# Patient Record
Sex: FEMALE | Race: WHITE | NOT HISPANIC OR LATINO | Employment: UNEMPLOYED
[De-identification: names, ages, dates, MRNs, and addresses within clinical notes are randomized per-mention and may not be internally consistent; named-entity substitution may affect disease eponyms.]

---

## 2017-02-11 ENCOUNTER — HOSPITAL (OUTPATIENT)
Dept: OTHER | Age: 37
End: 2017-02-11
Attending: RADIOLOGY

## 2017-02-27 ENCOUNTER — TELEPHONE (OUTPATIENT)
Dept: SURGERY | Facility: CLINIC | Age: 37
End: 2017-02-27

## 2017-03-14 ENCOUNTER — OFFICE VISIT (OUTPATIENT)
Dept: SURGERY | Facility: CLINIC | Age: 37
End: 2017-03-14

## 2017-03-14 VITALS
DIASTOLIC BLOOD PRESSURE: 78 MMHG | HEIGHT: 66 IN | BODY MASS INDEX: 21.21 KG/M2 | SYSTOLIC BLOOD PRESSURE: 118 MMHG | HEART RATE: 80 BPM | RESPIRATION RATE: 16 BRPM | WEIGHT: 132 LBS

## 2017-03-14 DIAGNOSIS — M54.9 INTRACTABLE BACK PAIN: ICD-10-CM

## 2017-03-14 DIAGNOSIS — C41.9 CHONDROSARCOMA OF BONE (HCC): ICD-10-CM

## 2017-03-14 DIAGNOSIS — M40.14 OTHER SECONDARY KYPHOSIS, THORACIC REGION: Primary | ICD-10-CM

## 2017-03-14 PROCEDURE — 99213 OFFICE O/P EST LOW 20 MIN: CPT | Performed by: NEUROLOGICAL SURGERY

## 2017-03-14 RX ORDER — CYCLOBENZAPRINE HCL 10 MG
10 TABLET ORAL 3 TIMES DAILY PRN
Qty: 60 TABLET | Refills: 1 | Status: SHIPPED | OUTPATIENT
Start: 2017-03-14 | End: 2017-08-24

## 2017-03-14 NOTE — PATIENT INSTRUCTIONS
Refill policies:    • Allow 2 business days for refills; controlled substances may take longer.   • Contact your pharmacy at least 5 days prior to running out of medication and have them send an electronic request or submit request through the “request re your physician has recommended that you have a procedure or additional testing performed. [facility] will contact your insurance carrier to obtain pre-certification or prior authorization.     Unfortunately, ANTOLIN has seen an increas

## 2017-03-14 NOTE — PROGRESS NOTES
Here for follow up with new MRI. Having lower extremity \"heaviness\" which has been getting worse. Having frequent leg cramps. Increased mid and lower back pain since last visit.

## 2017-03-14 NOTE — PROGRESS NOTES
Neurosurgery Clinic Visit  3/14/2017    Yazmin Wallace [address] PCP:  Julian Luna MD    1980 MRN [id number]     HISTORY OF PRESENT ILLNESS:  Kaylie Barnard is a(n) 39year old female here for follow-up  Patient got her MRI of her CT and L-s

## 2017-05-16 ENCOUNTER — OFFICE VISIT (OUTPATIENT)
Dept: NEUROLOGY | Facility: CLINIC | Age: 37
End: 2017-05-16

## 2017-05-16 VITALS
BODY MASS INDEX: 22 KG/M2 | WEIGHT: 137 LBS | RESPIRATION RATE: 18 BRPM | DIASTOLIC BLOOD PRESSURE: 80 MMHG | HEART RATE: 88 BPM | SYSTOLIC BLOOD PRESSURE: 120 MMHG

## 2017-05-16 DIAGNOSIS — R25.2 LEG CRAMPS: ICD-10-CM

## 2017-05-16 DIAGNOSIS — R25.3 TWITCHING: ICD-10-CM

## 2017-05-16 DIAGNOSIS — H93.11 TINNITUS, RIGHT: ICD-10-CM

## 2017-05-16 DIAGNOSIS — C41.0 CHORDOMA OF CLIVUS (HCC): ICD-10-CM

## 2017-05-16 DIAGNOSIS — G40.909 SEIZURE DISORDER (HCC): Primary | ICD-10-CM

## 2017-05-16 PROCEDURE — 99214 OFFICE O/P EST MOD 30 MIN: CPT | Performed by: OTHER

## 2017-05-16 NOTE — PATIENT INSTRUCTIONS
Refill policies:    • Allow 2 business days for refills; controlled substances may take longer.   • Contact your pharmacy at least 5 days prior to running out of medication and have them send an electronic request or submit request through the “request re insurance carrier to obtain pre-certification or prior authorization. Unfortunately, ANTOLIN has seen an increase in denial of payment even though the procedure/test has been pre-certified.   You are strongly encouraged to contact your insurance carrier to v

## 2017-05-16 NOTE — PROGRESS NOTES
HPI:    Patient ID: Amalia Barnes is a 39year old female. HPI   Anthony Alba is 39year old female who presented to establish care with me.  She has history of clivus chordoma s/p resection and radiotherapy in 2002 diagnosed in [place] complicated by ri OTHER SURGICAL HISTORY  4/24/2015    Comment PARTIAL LUMBAR 2, LUMBAR 3 LAMINECTOMY, PARTIAL LUMBAR 5, PARTIAL SACRAL 1 LAMINECTOMY , RESECTION OF INTRADURAL TUMOR       Family History   Problem Relation Age of Onset   • Cancer Mother 61     Cervical CA distress  Head: Normocephalic and atraumatic. Neck: supple  Cardiovascular: Normal rate, regular rhythm and normal heart sounds. Pulmonary/Chest: Effort normal and breath sounds normal.   Abdominal: Soft.  Bowel sounds are normal.   Skin: Skin is warm The patient indicates understanding of these issues and agrees with the plan.         Brenda Villa MD  [facility] This Visit:  No prescriptions requested or ordered in this encounter    Imaging & Referrals:  None     ID#[id number]

## 2017-06-06 ENCOUNTER — NURSE ONLY (OUTPATIENT)
Dept: NEUROLOGY | Facility: CLINIC | Age: 37
End: 2017-06-06

## 2017-06-06 DIAGNOSIS — G40.909 SEIZURE DISORDER (HCC): Primary | ICD-10-CM

## 2017-06-06 PROCEDURE — 95816 EEG AWAKE AND DROWSY: CPT | Performed by: OTHER

## 2017-06-07 NOTE — PROCEDURES
ELECTROENCEPHALOGRAM REPORT      Patient Name: [facility]  Chart ID: [id number]  Ordering Physician: Dr Diya Cotto           Date of Test: 6/6/2017  Patient Diagnosis: Seizure disorder    History:  43 Y/O FEMALE WITH H/O CLIVUS CHORDOMA s/p RESECTI recording there are medium to high amplitude sharps and spikes emerging from the right temporal region with phase reversal at T4. At times they spread to the right central and parietal regions and occasionally to the left hemisphere.  These abnormalities ar

## 2017-06-12 ENCOUNTER — OFFICE VISIT (OUTPATIENT)
Dept: NEUROLOGY | Facility: CLINIC | Age: 37
End: 2017-06-12

## 2017-06-12 VITALS
RESPIRATION RATE: 12 BRPM | HEART RATE: 84 BPM | BODY MASS INDEX: 22 KG/M2 | SYSTOLIC BLOOD PRESSURE: 124 MMHG | DIASTOLIC BLOOD PRESSURE: 80 MMHG | WEIGHT: 136 LBS

## 2017-06-12 DIAGNOSIS — G47.00 INSOMNIA, UNSPECIFIED TYPE: ICD-10-CM

## 2017-06-12 DIAGNOSIS — G40.909 SEIZURE DISORDER (HCC): Primary | ICD-10-CM

## 2017-06-12 DIAGNOSIS — C41.0 CHORDOMA OF CLIVUS (HCC): ICD-10-CM

## 2017-06-12 PROCEDURE — 99213 OFFICE O/P EST LOW 20 MIN: CPT | Performed by: OTHER

## 2017-06-12 RX ORDER — ZOLPIDEM TARTRATE 5 MG/1
5 TABLET ORAL NIGHTLY PRN
Qty: 30 TABLET | Refills: 1 | Status: SHIPPED | OUTPATIENT
Start: 2017-06-12 | End: 2018-08-14 | Stop reason: ALTCHOICE

## 2017-06-12 NOTE — PROGRESS NOTES
HPI:    Patient ID: Deacon Donahue is a 44year old female. Seizures  Pertinent negatives include no headaches. Patient presented for follow up for seizures, eye twitching and buzzing sensation in right ear.  She states since last seen the e vertigo and gait disturbance.     HISTORY:  Past Medical History   Diagnosis Date   • Brain tumor - 2002 s/p removal 6/14/2012   • Osteopenia due to depakote 6/14/2012   • Seizure disorder [facility])      last seizure was 2003 after brain surgery   • Sarcoma (HC daily.  ) Disp: 60 tablet Rfl: 1   LevETIRAcetam  MG Oral Tablet 24 Hr TAKE 1 TABLET BY MOUTH NIGHTLY. Disp: 90 tablet Rfl: 12   Calcium Carbonate-Vit D-Min (CALCIUM 1200 OR) Take 1 tablet by mouth daily.  Disp:  Rfl:    Multiple Vitamins-Minerals (MU dose Ambien to take only as needed and advised caution   Follow up in 3 months    See orders and medications filed with this encounter. The patient indicates understanding of these issues and agrees with the plan.           MD Dorothy Hess

## 2017-06-12 NOTE — PATIENT INSTRUCTIONS
Refill policies:    • Allow 2-3 business days for refills; controlled substances may take longer.   • Contact your pharmacy at least 5 days prior to running out of medication and have them send an electronic request or submit request through the [facility] have a procedure or additional testing performed. [facility]) will contact your insurance carrier to obtain pre-certification or prior authorization.     Unfortunately, ANTOLIN has seen an increase in denial of payment even though the p

## 2017-06-15 ENCOUNTER — TELEPHONE (OUTPATIENT)
Dept: SURGERY | Facility: CLINIC | Age: 37
End: 2017-06-15

## 2017-06-20 ENCOUNTER — TELEPHONE (OUTPATIENT)
Dept: SURGERY | Facility: CLINIC | Age: 37
End: 2017-06-20

## 2017-07-13 ENCOUNTER — HOSPITAL (OUTPATIENT)
Dept: OTHER | Age: 37
End: 2017-07-13
Attending: RADIOLOGY

## 2017-07-22 ENCOUNTER — HOSPITAL (OUTPATIENT)
Dept: OTHER | Age: 37
End: 2017-07-22
Attending: RADIOLOGY

## 2017-08-10 ENCOUNTER — LAB ENCOUNTER (OUTPATIENT)
Dept: LAB | Age: 37
End: 2017-08-10
Attending: DERMATOLOGY
Payer: COMMERCIAL

## 2017-08-10 DIAGNOSIS — D89.89 SECONDARY IMMUNE DEFICIENCY DISORDER (HCC): Primary | ICD-10-CM

## 2017-08-10 LAB
C-REACTIVE PROTEIN: <0.29 MG/DL (ref ?–1)
FREE T4: 0.8 NG/DL (ref 0.9–1.8)
SED RATE-ML: 10 MM/HR (ref 0–25)
TSI SER-ACNC: 1.26 MIU/ML (ref 0.35–5.5)

## 2017-08-10 PROCEDURE — 84439 ASSAY OF FREE THYROXINE: CPT

## 2017-08-10 PROCEDURE — 86140 C-REACTIVE PROTEIN: CPT

## 2017-08-10 PROCEDURE — 86235 NUCLEAR ANTIGEN ANTIBODY: CPT

## 2017-08-10 PROCEDURE — 84443 ASSAY THYROID STIM HORMONE: CPT

## 2017-08-10 PROCEDURE — 85652 RBC SED RATE AUTOMATED: CPT

## 2017-08-10 PROCEDURE — 86038 ANTINUCLEAR ANTIBODIES: CPT

## 2017-08-11 LAB
ANA SCREEN: NEGATIVE
SSA AUTOAB: <100 AU/ML (ref ?–100)
SSB AUTOAB: <100 AU/ML (ref ?–100)

## 2017-08-17 ENCOUNTER — TELEPHONE (OUTPATIENT)
Dept: SURGERY | Facility: CLINIC | Age: 37
End: 2017-08-17

## 2017-08-24 ENCOUNTER — TELEPHONE (OUTPATIENT)
Dept: SURGERY | Facility: CLINIC | Age: 37
End: 2017-08-24

## 2017-08-24 ENCOUNTER — OFFICE VISIT (OUTPATIENT)
Dept: SURGERY | Facility: CLINIC | Age: 37
End: 2017-08-24

## 2017-08-24 VITALS
WEIGHT: 136 LBS | SYSTOLIC BLOOD PRESSURE: 126 MMHG | BODY MASS INDEX: 21.35 KG/M2 | DIASTOLIC BLOOD PRESSURE: 84 MMHG | HEART RATE: 64 BPM | HEIGHT: 67 IN

## 2017-08-24 DIAGNOSIS — G40.909 SEIZURE DISORDER (HCC): ICD-10-CM

## 2017-08-24 DIAGNOSIS — D49.6 BRAIN TUMOR (HCC): ICD-10-CM

## 2017-08-24 DIAGNOSIS — C41.9 CHONDROSARCOMA OF BONE (HCC): ICD-10-CM

## 2017-08-24 DIAGNOSIS — M40.294 OTHER KYPHOSIS OF THORACIC REGION: Primary | ICD-10-CM

## 2017-08-24 DIAGNOSIS — H49.01 PARTIAL RIGHT THIRD NERVE PALSY: ICD-10-CM

## 2017-08-24 DIAGNOSIS — M51.34 DDD (DEGENERATIVE DISC DISEASE), THORACIC: ICD-10-CM

## 2017-08-24 DIAGNOSIS — C41.0 CHORDOMA OF CLIVUS (HCC): ICD-10-CM

## 2017-08-24 PROCEDURE — 99213 OFFICE O/P EST LOW 20 MIN: CPT | Performed by: NURSE PRACTITIONER

## 2017-08-24 RX ORDER — DIAZEPAM 5 MG/1
5 TABLET ORAL
COMMUNITY
Start: 2017-07-05 | End: 2018-03-23 | Stop reason: ALTCHOICE

## 2017-08-24 RX ORDER — CLOBETASOL PROPIONATE 0.5 MG/G
OINTMENT TOPICAL
COMMUNITY
Start: 2017-08-10 | End: 2017-11-20

## 2017-08-24 RX ORDER — TACROLIMUS 1 MG/G
OINTMENT TOPICAL
COMMUNITY
Start: 2017-08-14 | End: 2017-11-20

## 2017-08-24 RX ORDER — TIZANIDINE 2 MG/1
2 TABLET ORAL EVERY 8 HOURS PRN
Qty: 90 TABLET | Refills: 0 | Status: SHIPPED | OUTPATIENT
Start: 2017-08-24 | End: 2017-11-20

## 2017-08-24 NOTE — PATIENT INSTRUCTIONS
Refill policies:    • Allow 2-3 business days for refills; controlled substances may take longer.   • Contact your pharmacy at least 5 days prior to running out of medication and have them send an electronic request or submit request through the [facility] have a procedure or additional testing performed. [facility]) will contact your insurance carrier to obtain pre-certification or prior authorization.     Unfortunately, ANTOLIN has seen an increase in denial of payment even though the p

## 2017-08-24 NOTE — PROGRESS NOTES
Neurosurgery Clinic Visit  2017    Yazmin Wallace [address] [facility] [facility] PCP:  Julian Luna MD    1980 MRN [id number]       Chief Complaint:  Patient presents with:   Follow - Up: MRI      HISTORY OF PRESENT ILLNESS:  Kaylie Barnard is a(n) 44 year ol Carbonate-Vit D-Min (CALCIUM 1200 OR) Take 1 tablet by mouth daily. Disp:  Rfl:    Multiple Vitamins-Minerals (MULTIVITAMIN OR) Take 1 tablet by mouth daily. Disp:  Rfl:    Magnesium 500 MG Oral Tab Take 1 tablet by mouth daily.  Disp:  Rfl:    Glucosamine with Dr. Brittnee Pham possible RFA of the thoracic facet joint as she has had 60% relief for a few weeks  in the past with this injection. Advised patient that since this was done several months ago she may need to follow-up with Dr. Brittnee Pham.       Imaging and anatom

## 2017-08-25 ENCOUNTER — TELEPHONE (OUTPATIENT)
Dept: SURGERY | Facility: CLINIC | Age: 37
End: 2017-08-25

## 2017-08-25 NOTE — TELEPHONE ENCOUNTER
Spoke with Dr. Jerris Cranker regarding possible RFA to the thoracic facet joints as she had had relief with her previous facet joint injections. He prefers to see her in the office.   Called patient and spoke with her and she reports she has an appointment with [facility]

## 2017-09-08 ENCOUNTER — OFFICE VISIT (OUTPATIENT)
Dept: SURGERY | Facility: CLINIC | Age: 37
End: 2017-09-08

## 2017-09-08 VITALS
WEIGHT: 136 LBS | DIASTOLIC BLOOD PRESSURE: 68 MMHG | BODY MASS INDEX: 21.35 KG/M2 | HEIGHT: 67 IN | RESPIRATION RATE: 16 BRPM | HEART RATE: 74 BPM | SYSTOLIC BLOOD PRESSURE: 122 MMHG

## 2017-09-08 DIAGNOSIS — M47.814 FACET ARTHROPATHY, THORACIC: Primary | ICD-10-CM

## 2017-09-08 DIAGNOSIS — M79.18 MYOFASCIAL MUSCLE PAIN: ICD-10-CM

## 2017-09-08 DIAGNOSIS — R56.9 SEIZURES (HCC): ICD-10-CM

## 2017-09-08 PROCEDURE — 99213 OFFICE O/P EST LOW 20 MIN: CPT | Performed by: PHYSICIAN ASSISTANT

## 2017-09-08 NOTE — PATIENT INSTRUCTIONS
Refill policies:    • Allow 2-3 business days for refills; controlled substances may take longer.   • Contact your pharmacy at least 5 days prior to running out of medication and have them send an electronic request or submit request through the [facility] have a procedure or additional testing performed. [facility]) will contact your insurance carrier to obtain pre-certification or prior authorization.     Unfortunately, ANTOLIN has seen an increase in denial of payment even though the p

## 2017-09-08 NOTE — PROGRESS NOTES
Name: Trena Guzman   : 1980   DOS: 2017     Pain Clinic Follow Up Visit:   Patient presents with:   Follow - Up: back pain      Trena Guzman is a 44year old female with seizure disorder after clivus chordorma resection in [place] no acute distress. Neurologic[de-identified] WNL-Orientation to time, place and person, normal mood & affect, concentration & attention span intact. Inspection:  Ambulates with well-coordinated, fluid, non-antalgic gait.   Gait is normal.  Back: no tenderness to bila

## 2017-09-19 ENCOUNTER — TELEPHONE (OUTPATIENT)
Dept: SURGERY | Facility: CLINIC | Age: 37
End: 2017-09-19

## 2017-11-08 ENCOUNTER — TELEPHONE (OUTPATIENT)
Dept: SURGERY | Facility: CLINIC | Age: 37
End: 2017-11-08

## 2017-11-12 DIAGNOSIS — R56.9 SEIZURES (HCC): ICD-10-CM

## 2017-11-13 RX ORDER — LEVETIRACETAM 750 MG/1
TABLET, FILM COATED, EXTENDED RELEASE ORAL
Qty: 30 TABLET | Refills: 1 | Status: SHIPPED | OUTPATIENT
Start: 2017-11-13 | End: 2018-01-16

## 2017-11-14 ENCOUNTER — HOSPITAL (OUTPATIENT)
Dept: OTHER | Age: 37
End: 2017-11-14
Attending: OBSTETRICS & GYNECOLOGY

## 2017-11-15 ENCOUNTER — CHARTING TRANS (OUTPATIENT)
Dept: OTHER | Age: 37
End: 2017-11-15

## 2017-11-20 PROBLEM — R79.89 LFT ELEVATION: Status: ACTIVE | Noted: 2017-11-20

## 2017-12-04 PROCEDURE — 87340 HEPATITIS B SURFACE AG IA: CPT | Performed by: INTERNAL MEDICINE

## 2017-12-04 PROCEDURE — 83883 ASSAY NEPHELOMETRY NOT SPEC: CPT | Performed by: INTERNAL MEDICINE

## 2017-12-04 PROCEDURE — 82784 ASSAY IGA/IGD/IGG/IGM EACH: CPT | Performed by: INTERNAL MEDICINE

## 2017-12-04 PROCEDURE — 82746 ASSAY OF FOLIC ACID SERUM: CPT | Performed by: INTERNAL MEDICINE

## 2017-12-04 PROCEDURE — 83516 IMMUNOASSAY NONANTIBODY: CPT | Performed by: INTERNAL MEDICINE

## 2017-12-04 PROCEDURE — 82607 VITAMIN B-12: CPT | Performed by: INTERNAL MEDICINE

## 2017-12-04 PROCEDURE — 86706 HEP B SURFACE ANTIBODY: CPT | Performed by: INTERNAL MEDICINE

## 2017-12-04 PROCEDURE — 86803 HEPATITIS C AB TEST: CPT | Performed by: INTERNAL MEDICINE

## 2017-12-14 ENCOUNTER — TELEPHONE (OUTPATIENT)
Dept: SURGERY | Facility: CLINIC | Age: 37
End: 2017-12-14

## 2017-12-18 PROCEDURE — 82390 ASSAY OF CERULOPLASMIN: CPT | Performed by: INTERNAL MEDICINE

## 2017-12-18 PROCEDURE — 82104 ALPHA-1-ANTITRYPSIN PHENO: CPT | Performed by: INTERNAL MEDICINE

## 2017-12-18 PROCEDURE — 82103 ALPHA-1-ANTITRYPSIN TOTAL: CPT | Performed by: INTERNAL MEDICINE

## 2018-01-16 DIAGNOSIS — R56.9 SEIZURES (HCC): ICD-10-CM

## 2018-01-16 RX ORDER — LEVETIRACETAM 750 MG/1
TABLET, FILM COATED, EXTENDED RELEASE ORAL
Qty: 30 TABLET | Refills: 0 | Status: SHIPPED | OUTPATIENT
Start: 2018-01-16 | End: 2018-02-15

## 2018-02-15 DIAGNOSIS — R56.9 SEIZURES (HCC): ICD-10-CM

## 2018-02-16 NOTE — TELEPHONE ENCOUNTER
From: Oliva Whitlock  Sent: 2/15/2018 5:57 PM CST  Subject: Medication Renewal Request    Janee Gregory Player would like a refill of the following medications:     LEVETIRACETAM  MG Oral Tablet 24 Hr Jose Jaimes MD]    Preferred pharmacy: [facility] [address], [address] [phone], [phone]

## 2018-02-19 RX ORDER — LEVETIRACETAM 750 MG/1
1 TABLET, FILM COATED, EXTENDED RELEASE ORAL NIGHTLY
Qty: 30 TABLET | Refills: 0 | Status: SHIPPED
Start: 2018-02-19 | End: 2018-03-23

## 2018-02-20 RX ORDER — LEVETIRACETAM 750 MG/1
TABLET, EXTENDED RELEASE ORAL
Qty: 30 TABLET | Refills: 0 | OUTPATIENT
Start: 2018-02-20

## 2018-02-20 NOTE — TELEPHONE ENCOUNTER
3/23/18 appointment made. Apryl Renee was approved and sent to pharmacy 2/19/18. Verified pharmacy received it.

## 2018-03-23 ENCOUNTER — OFFICE VISIT (OUTPATIENT)
Dept: NEUROLOGY | Facility: CLINIC | Age: 38
End: 2018-03-23

## 2018-03-23 VITALS
BODY MASS INDEX: 22 KG/M2 | SYSTOLIC BLOOD PRESSURE: 122 MMHG | HEART RATE: 84 BPM | WEIGHT: 135 LBS | DIASTOLIC BLOOD PRESSURE: 84 MMHG | RESPIRATION RATE: 16 BRPM

## 2018-03-23 DIAGNOSIS — R56.9 SEIZURES (HCC): ICD-10-CM

## 2018-03-23 DIAGNOSIS — G40.909 SEIZURE DISORDER (HCC): Primary | ICD-10-CM

## 2018-03-23 PROCEDURE — 99213 OFFICE O/P EST LOW 20 MIN: CPT | Performed by: OTHER

## 2018-03-23 RX ORDER — LEVETIRACETAM 750 MG/1
1 TABLET, EXTENDED RELEASE ORAL NIGHTLY
Qty: 30 TABLET | Refills: 5 | Status: SHIPPED | OUTPATIENT
Start: 2018-03-23 | End: 2018-09-23

## 2018-03-23 NOTE — PATIENT INSTRUCTIONS
Refill policies:    • Allow 2-3 business days for refills; controlled substances may take longer.   • Contact your pharmacy at least 5 days prior to running out of medication and have them send an electronic request or submit request through the [facility] for the entire amount billed. Precertification and Prior Authorizations  If your physician has recommended that you have a procedure or additional testing performed.   [facility] [employer] ([employer]) will contact your insurance carrier to obtain pr

## 2018-03-26 PROCEDURE — 88313 SPECIAL STAINS GROUP 2: CPT | Performed by: INTERNAL MEDICINE

## 2018-03-26 PROCEDURE — 88307 TISSUE EXAM BY PATHOLOGIST: CPT | Performed by: INTERNAL MEDICINE

## 2018-07-30 ENCOUNTER — TELEPHONE (OUTPATIENT)
Dept: SURGERY | Facility: CLINIC | Age: 38
End: 2018-07-30

## 2018-08-14 ENCOUNTER — OFFICE VISIT (OUTPATIENT)
Dept: SURGERY | Facility: CLINIC | Age: 38
End: 2018-08-14
Payer: COMMERCIAL

## 2018-08-14 VITALS — HEART RATE: 80 BPM | DIASTOLIC BLOOD PRESSURE: 70 MMHG | SYSTOLIC BLOOD PRESSURE: 110 MMHG | HEIGHT: 65 IN

## 2018-08-14 DIAGNOSIS — M40.14 OTHER SECONDARY KYPHOSIS, THORACIC REGION: ICD-10-CM

## 2018-08-14 DIAGNOSIS — M54.9 INTRACTABLE BACK PAIN: ICD-10-CM

## 2018-08-14 DIAGNOSIS — M54.40 BILATERAL LOW BACK PAIN WITH SCIATICA, SCIATICA LATERALITY UNSPECIFIED, UNSPECIFIED CHRONICITY: Primary | ICD-10-CM

## 2018-08-14 PROCEDURE — 99213 OFFICE O/P EST LOW 20 MIN: CPT | Performed by: NEUROLOGICAL SURGERY

## 2018-08-14 NOTE — PROGRESS NOTES
Neurosurgery Clinic Visit  2018    Penelope Junaid [address] [facility] PCP:  Mimi Jasso MD    1980 MRN [id number]     HISTORY OF PRESENT ILLNESS:  Kari Canales is a(n) 43year old female here for yearly follow-up  She is doing great  No complai

## 2018-08-14 NOTE — PROGRESS NOTES
Pt is here for follow up to review imaging   Last office visit 1 yr ago     Denies any changes she would like to discuss

## 2018-09-23 DIAGNOSIS — R56.9 SEIZURES (HCC): ICD-10-CM

## 2018-09-24 RX ORDER — LEVETIRACETAM 750 MG/1
TABLET, FILM COATED, EXTENDED RELEASE ORAL
Qty: 30 TABLET | Refills: 0 | Status: SHIPPED | OUTPATIENT
Start: 2018-09-24 | End: 2018-10-23

## 2018-09-25 ENCOUNTER — TELEPHONE (OUTPATIENT)
Dept: SURGERY | Facility: CLINIC | Age: 38
End: 2018-09-25

## 2018-10-12 PROBLEM — S90.122A CONTUSION OF LESSER TOE OF LEFT FOOT WITHOUT DAMAGE TO NAIL, INITIAL ENCOUNTER: Status: ACTIVE | Noted: 2018-10-12

## 2018-10-23 ENCOUNTER — OFFICE VISIT (OUTPATIENT)
Dept: NEUROLOGY | Facility: CLINIC | Age: 38
End: 2018-10-23
Payer: COMMERCIAL

## 2018-10-23 VITALS
HEART RATE: 78 BPM | BODY MASS INDEX: 21 KG/M2 | RESPIRATION RATE: 16 BRPM | SYSTOLIC BLOOD PRESSURE: 120 MMHG | WEIGHT: 132 LBS | DIASTOLIC BLOOD PRESSURE: 76 MMHG

## 2018-10-23 DIAGNOSIS — G40.909 SEIZURE DISORDER (HCC): Primary | ICD-10-CM

## 2018-10-23 DIAGNOSIS — R56.9 SEIZURES (HCC): ICD-10-CM

## 2018-10-23 PROCEDURE — 99213 OFFICE O/P EST LOW 20 MIN: CPT | Performed by: OTHER

## 2018-10-23 RX ORDER — LEVETIRACETAM 750 MG/1
1 TABLET, EXTENDED RELEASE ORAL NIGHTLY
Qty: 30 TABLET | Refills: 5 | Status: SHIPPED | OUTPATIENT
Start: 2018-10-23 | End: 2019-06-19

## 2018-10-23 NOTE — PROGRESS NOTES
The patient is here for a follow-up for seizures. The patient has had no seizures since her last office visit.

## 2018-10-23 NOTE — PROGRESS NOTES
HPI:    Patient ID: Pam Benites is a 43year old female. HPI      Patient presented for follow up for seizures. She is doing fine and denies any breakthrough seizure activity and aura symptoms.  States the liver biopsy shows a fatty liver but sh Onset   • Cancer Mother 61        Cervical CA   • Heart Disorder Mother    • Heart Disorder Father       Social History    Tobacco Use      Smoking status: Former Smoker        Packs/day: 0.10        Types: Cigarettes      Smokeless tobacco: Never Used developed, well nourished  Head: Normocephalic and atraumatic. Neck: Normal range of motion. Neck supple. Cardiovascular: Normal rate, regular rhythm and normal heart sounds. Pulmonary/Chest: Effort normal and breath sounds normal.   Abdominal: Soft.

## 2018-11-21 ENCOUNTER — TELEPHONE (OUTPATIENT)
Dept: SURGERY | Facility: CLINIC | Age: 38
End: 2018-11-21

## 2019-06-19 DIAGNOSIS — R56.9 SEIZURES (HCC): ICD-10-CM

## 2019-06-19 RX ORDER — LEVETIRACETAM 750 MG/1
TABLET, FILM COATED, EXTENDED RELEASE ORAL
Qty: 30 TABLET | Refills: 2 | Status: SHIPPED | OUTPATIENT
Start: 2019-06-19 | End: 2019-08-26

## 2019-08-19 ENCOUNTER — TELEPHONE (OUTPATIENT)
Dept: SURGERY | Facility: CLINIC | Age: 39
End: 2019-08-19

## 2019-08-19 NOTE — TELEPHONE ENCOUNTER
2 copies of OV notes dated 08/13 received, one for Dr. Bashir Durand, one for Dr. Tawny Gunter; one copy endorsed to each provider

## 2019-08-24 DIAGNOSIS — R56.9 SEIZURES (HCC): ICD-10-CM

## 2019-08-26 RX ORDER — LEVETIRACETAM 750 MG/1
TABLET, EXTENDED RELEASE ORAL
Qty: 30 TABLET | Refills: 0 | Status: SHIPPED | OUTPATIENT
Start: 2019-08-26 | End: 2019-09-24

## 2019-08-26 NOTE — TELEPHONE ENCOUNTER
Patient informed  Needs appointment for further refills. Patient given 9/24/19 OV.    Medication: Keppra    Date of last refill: 6/19/19 (#30/2)  [place]    Last office visit: 10/23/18  Due back to clinic per last offi

## 2019-09-05 ENCOUNTER — OFFICE VISIT (OUTPATIENT)
Dept: SURGERY | Facility: CLINIC | Age: 39
End: 2019-09-05
Payer: COMMERCIAL

## 2019-09-05 ENCOUNTER — TELEPHONE (OUTPATIENT)
Dept: SURGERY | Facility: CLINIC | Age: 39
End: 2019-09-05

## 2019-09-05 VITALS
RESPIRATION RATE: 16 BRPM | SYSTOLIC BLOOD PRESSURE: 110 MMHG | DIASTOLIC BLOOD PRESSURE: 70 MMHG | OXYGEN SATURATION: 98 % | HEART RATE: 75 BPM

## 2019-09-05 DIAGNOSIS — C41.9 CHONDROSARCOMA (HCC): Primary | ICD-10-CM

## 2019-09-05 PROCEDURE — 99213 OFFICE O/P EST LOW 20 MIN: CPT | Performed by: PHYSICIAN ASSISTANT

## 2019-09-05 NOTE — PROGRESS NOTES
Neurosurgery Clinic Visit  2019    Janee BECERRA [address] [facility] PCP:  Saskia Kline MD    1980 MRN [id number]     HISTORY OF PRESENT ILLNESS:  Nikos Bautista is a(n) 35year old female who is here for yearly follow-up for brain and spinal cord case at the [facility] neuro oncology tumor board to decide on continued observation vs. Gamma knife radiosurgery for the cerebellar lesion. Will await recommendations from this conference, and we will also review our our neuro-oncology conference.   Will contact

## 2019-09-05 NOTE — TELEPHONE ENCOUNTER
Spoke with [facility] and requested reports be faxed to our office. Once reports are received will endorse to /NATALI Harley.

## 2019-09-12 ENCOUNTER — TELEPHONE (OUTPATIENT)
Dept: SURGERY | Facility: CLINIC | Age: 39
End: 2019-09-12

## 2019-09-12 DIAGNOSIS — M40.14 OTHER SECONDARY KYPHOSIS, THORACIC REGION: ICD-10-CM

## 2019-09-12 DIAGNOSIS — M54.40 BILATERAL LOW BACK PAIN WITH SCIATICA, SCIATICA LATERALITY UNSPECIFIED, UNSPECIFIED CHRONICITY: Primary | ICD-10-CM

## 2019-09-12 DIAGNOSIS — M54.9 INTRACTABLE BACK PAIN: ICD-10-CM

## 2019-09-12 NOTE — TELEPHONE ENCOUNTER
Called and spoke with patient    Case discussed at tumor board  Lesion has been there since at least 2016  She plans on having gamma knife to lesion in a few weeks    Asked for physical therapy script, rx to be mailed    Will f/u after gamma knife    Pt ap

## 2019-09-24 ENCOUNTER — OFFICE VISIT (OUTPATIENT)
Dept: NEUROLOGY | Facility: CLINIC | Age: 39
End: 2019-09-24
Payer: COMMERCIAL

## 2019-09-24 VITALS
HEART RATE: 68 BPM | RESPIRATION RATE: 16 BRPM | WEIGHT: 126 LBS | SYSTOLIC BLOOD PRESSURE: 116 MMHG | BODY MASS INDEX: 20 KG/M2 | DIASTOLIC BLOOD PRESSURE: 60 MMHG

## 2019-09-24 DIAGNOSIS — G40.909 SEIZURE DISORDER (HCC): Primary | ICD-10-CM

## 2019-09-24 PROCEDURE — 99213 OFFICE O/P EST LOW 20 MIN: CPT | Performed by: OTHER

## 2019-09-24 RX ORDER — LEVETIRACETAM 750 MG/1
TABLET, EXTENDED RELEASE ORAL
Qty: 30 TABLET | Refills: 5 | Status: SHIPPED | OUTPATIENT
Start: 2019-09-24 | End: 2020-03-23

## 2019-09-24 NOTE — PROGRESS NOTES
The patient states no seizures in the past year. Patient states some slight aura, however no seizures. Patient states she is having whole body tremors which happen at least once a month. Patient is also having some stomach issues.  Denies and balance issues

## 2019-09-24 NOTE — PROGRESS NOTES
HPI:    Patient ID: Freddie Saeed is a 35year old female. HPI      Patient presented for follow up for seizure disorder. She had no seizures in the past year. Patient states some slight aura, however no seizures.  Patient states she is having gus THORACIC FACET JOINT INJECTION DIAGNOSTIC Left 9/12/2016    Performed by Luis Martinez MD at [facility] MAIN OR   • THORACIC FACET JOINT INJECTION DIAGNOSTIC Right 8/29/2016    Performed by Luis Martinez MD at [facility] MAIN OR      Family History   Problem Relat [[place]]       UNKNOWN    Comment:Had reaction as a child. PHYSICAL EXAM:   Physical Exam    Blood pressure 116/60, pulse 68, resp. rate 16, weight 126 lb.   General: A 44year old female, well developed, well nourished  Head: Normocephalic and atrau

## 2019-10-02 ENCOUNTER — TELEPHONE (OUTPATIENT)
Dept: SURGERY | Facility: CLINIC | Age: 39
End: 2019-10-02

## 2019-11-20 ENCOUNTER — TELEPHONE (OUTPATIENT)
Dept: SURGERY | Facility: CLINIC | Age: 39
End: 2019-11-20

## 2019-11-20 NOTE — TELEPHONE ENCOUNTER
progress note for DOS 11/14/2019 given to Dr. Harris Galvan for review and signature  then fax back.

## 2019-12-27 PROBLEM — J18.9 PNEUMONIA OF RIGHT LOWER LOBE DUE TO INFECTIOUS ORGANISM: Status: ACTIVE | Noted: 2019-12-27

## 2020-01-09 ENCOUNTER — HOSPITAL (OUTPATIENT)
Dept: OTHER | Age: 40
End: 2020-01-09

## 2020-01-09 ENCOUNTER — DIAGNOSTIC TRANS (OUTPATIENT)
Dept: OTHER | Age: 40
End: 2020-01-09

## 2020-01-09 LAB
ALBUMIN SERPL-MCNC: 3.9 G/DL (ref 3.6–5.1)
ALP SERPL-CCNC: 91 UNITS/L (ref 45–117)
ALT SERPL-CCNC: 167 UNITS/L
AMORPH SED URNS QL MICRO: ABNORMAL
ANALYZER ANC (IANC): ABNORMAL
ANION GAP SERPL CALC-SCNC: 10 MMOL/L (ref 10–20)
APPEARANCE UR: ABNORMAL
APTT PPP: 25 SEC (ref 22–32)
APTT PPP: NORMAL S
APTT PPP: NORMAL S
AST SERPL-CCNC: 385 UNITS/L
BACTERIA #/AREA URNS HPF: ABNORMAL /HPF
BASOPHILS # BLD: 0 K/MCL (ref 0–0.3)
BASOPHILS NFR BLD: 0 %
BILIRUB CONJ SERPL-MCNC: 1.1 MG/DL (ref 0–0.2)
BILIRUB SERPL-MCNC: 3.1 MG/DL (ref 0.2–1)
BILIRUB UR QL: NEGATIVE
BUN SERPL-MCNC: 18 MG/DL (ref 6–20)
BUN/CREAT SERPL: 29 (ref 7–25)
CALCIUM SERPL-MCNC: 8.9 MG/DL (ref 8.4–10.2)
CAOX CRY URNS QL MICRO: ABNORMAL
CHLORIDE SERPL-SCNC: 106 MMOL/L (ref 98–107)
CO2 SERPL-SCNC: 23 MMOL/L (ref 21–32)
COLOR UR: ABNORMAL
CREAT SERPL-MCNC: 0.63 MG/DL (ref 0.51–0.95)
DIFFERENTIAL METHOD BLD: ABNORMAL
EOSINOPHIL # BLD: 0.1 K/MCL (ref 0.1–0.5)
EOSINOPHIL NFR BLD: 1 %
EPITH CASTS #/AREA URNS LPF: ABNORMAL /[LPF]
ERYTHROCYTE [DISTWIDTH] IN BLOOD: 12.5 % (ref 11–15)
ETHANOL SERPL-MCNC: NORMAL MG/DL
FATTY CASTS #/AREA URNS LPF: ABNORMAL /[LPF]
GLUCOSE SERPL-MCNC: 116 MG/DL (ref 65–99)
GLUCOSE UR-MCNC: NEGATIVE MG/DL
GRAN CASTS #/AREA URNS LPF: ABNORMAL /[LPF]
HCG POINT OF CARE (5HGRST): NEGATIVE
HCT VFR BLD CALC: 41.4 % (ref 36–46.5)
HGB BLD-MCNC: 14.4 G/DL (ref 12–15.5)
HGB UR QL: NEGATIVE
HYALINE CASTS #/AREA URNS LPF: ABNORMAL /LPF (ref 0–5)
IMM GRANULOCYTES # BLD AUTO: 0 K/MCL (ref 0–0.2)
IMM GRANULOCYTES NFR BLD: 0 %
INR PPP: 1
INR PPP: NORMAL
KETONES UR-MCNC: 20 MG/DL
LEUKOCYTE ESTERASE UR QL STRIP: NEGATIVE
LIPASE SERPL-CCNC: ABNORMAL U/L
LIPASE SERPL-CCNC: ABNORMAL UNITS/L (ref 73–393)
LYMPHOCYTES # BLD: 2 K/MCL (ref 1–4.8)
LYMPHOCYTES NFR BLD: 30 %
MAGNESIUM SERPL-MCNC: 1.9 MG/DL (ref 1.7–2.4)
MCH RBC QN AUTO: 36.5 PG (ref 26–34)
MCHC RBC AUTO-ENTMCNC: 34.8 G/DL (ref 32–36.5)
MCV RBC AUTO: 104.8 FL (ref 78–100)
MIXED CELL CASTS #/AREA URNS LPF: ABNORMAL /[LPF]
MONOCYTES # BLD: 1 K/MCL (ref 0.3–0.9)
MONOCYTES NFR BLD: 15 %
MUCOUS THREADS URNS QL MICRO: PRESENT
NEUTROPHILS # BLD: 3.6 K/MCL (ref 1.8–7.7)
NEUTROPHILS NFR BLD: 54 %
NEUTS SEG NFR BLD: ABNORMAL %
NITRITE UR QL: NEGATIVE
NRBC (NRBCRE): 0 /100 WBC
PH UR: 6 UNITS (ref 5–7)
PLATELET # BLD: 149 K/MCL (ref 140–450)
POTASSIUM SERPL-SCNC: 3.3 MMOL/L (ref 3.4–5.1)
PROCALCITONIN SERPL IA-MCNC: 0.06 NG/ML
PROCALCITONIN SERPL IA-MCNC: NORMAL NG/ML
PROT SERPL-MCNC: 8 G/DL (ref 6.4–8.2)
PROT UR QL: NEGATIVE MG/DL
PROTHROMBIN TIME (PRT2): NORMAL
PROTHROMBIN TIME: 10.3 SEC (ref 9.7–11.8)
RBC # BLD: 3.95 MIL/MCL (ref 4–5.2)
RBC #/AREA URNS HPF: ABNORMAL /HPF (ref 0–2)
RBC CASTS #/AREA URNS LPF: ABNORMAL /[LPF]
RENAL EPI CELLS #/AREA URNS HPF: ABNORMAL /[HPF]
SODIUM SERPL-SCNC: 136 MMOL/L (ref 135–145)
SP GR UR: 1.02 (ref 1–1.03)
SPECIMEN SOURCE: ABNORMAL
SPERM URNS QL MICRO: ABNORMAL
SQUAMOUS #/AREA URNS HPF: ABNORMAL /HPF (ref 0–5)
T VAGINALIS URNS QL MICRO: ABNORMAL
TRI-PHOS CRY URNS QL MICRO: ABNORMAL
URATE CRY URNS QL MICRO: ABNORMAL
URINE REFLEX: ABNORMAL
URNS CMNT MICRO: ABNORMAL
UROBILINOGEN UR QL: 4 MG/DL (ref 0–1)
WAXY CASTS #/AREA URNS LPF: ABNORMAL /[LPF]
WBC # BLD: 6.6 K/MCL (ref 4.2–11)
WBC #/AREA URNS HPF: ABNORMAL /HPF (ref 0–5)
WBC CASTS #/AREA URNS LPF: ABNORMAL /[LPF]
YEAST HYPHAE URNS QL MICRO: ABNORMAL
YEAST URNS QL MICRO: ABNORMAL

## 2020-01-10 LAB
ALBUMIN SERPL-MCNC: 2.6 G/DL (ref 3.6–5.1)
ALBUMIN SERPL-MCNC: 2.6 G/DL (ref 3.6–5.1)
ALBUMIN/GLOB SERPL: 0.9 {RATIO} (ref 1–2.4)
ALBUMIN/GLOB SERPL: 0.9 {RATIO} (ref 1–2.4)
ALP SERPL-CCNC: 58 UNITS/L (ref 45–117)
ALP SERPL-CCNC: 60 UNITS/L (ref 45–117)
ALT SERPL-CCNC: 75 UNITS/L
ALT SERPL-CCNC: 75 UNITS/L
ANALYZER ANC (IANC): ABNORMAL
ANALYZER ANC (IANC): ABNORMAL
ANION GAP SERPL CALC-SCNC: 10 MMOL/L (ref 10–20)
ANION GAP SERPL CALC-SCNC: 9 MMOL/L (ref 10–20)
AST SERPL-CCNC: 105 UNITS/L
AST SERPL-CCNC: 109 UNITS/L
BASOPHILS # BLD: 0 K/MCL (ref 0–0.3)
BASOPHILS # BLD: 0 K/MCL (ref 0–0.3)
BASOPHILS NFR BLD: 0 %
BASOPHILS NFR BLD: 0 %
BILIRUB SERPL-MCNC: 2.4 MG/DL (ref 0.2–1)
BILIRUB SERPL-MCNC: 2.5 MG/DL (ref 0.2–1)
BUN SERPL-MCNC: 9 MG/DL (ref 6–20)
BUN SERPL-MCNC: 9 MG/DL (ref 6–20)
BUN/CREAT SERPL: 15 (ref 7–25)
BUN/CREAT SERPL: 15 (ref 7–25)
CALCIUM SERPL-MCNC: 7.3 MG/DL (ref 8.4–10.2)
CALCIUM SERPL-MCNC: 7.5 MG/DL (ref 8.4–10.2)
CHLORIDE SERPL-SCNC: 109 MMOL/L (ref 98–107)
CHLORIDE SERPL-SCNC: 109 MMOL/L (ref 98–107)
CO2 SERPL-SCNC: 24 MMOL/L (ref 21–32)
CO2 SERPL-SCNC: 24 MMOL/L (ref 21–32)
CREAT SERPL-MCNC: 0.6 MG/DL (ref 0.51–0.95)
CREAT SERPL-MCNC: 0.62 MG/DL (ref 0.51–0.95)
DIFFERENTIAL METHOD BLD: ABNORMAL
DIFFERENTIAL METHOD BLD: ABNORMAL
EOSINOPHIL # BLD: 0.1 K/MCL (ref 0.1–0.5)
EOSINOPHIL # BLD: 0.1 K/MCL (ref 0.1–0.5)
EOSINOPHIL NFR BLD: 2 %
EOSINOPHIL NFR BLD: 2 %
ERYTHROCYTE [DISTWIDTH] IN BLOOD: 12.6 % (ref 11–15)
ERYTHROCYTE [DISTWIDTH] IN BLOOD: 12.7 % (ref 11–15)
FOLATE SERPL-MCNC: 14.3 NG/ML
GLOBULIN SER-MCNC: 2.9 G/DL (ref 2–4)
GLOBULIN SER-MCNC: 3 G/DL (ref 2–4)
GLUCOSE SERPL-MCNC: 65 MG/DL (ref 65–99)
GLUCOSE SERPL-MCNC: 68 MG/DL (ref 65–99)
HCT VFR BLD CALC: 35.8 % (ref 36–46.5)
HCT VFR BLD CALC: 36.1 % (ref 36–46.5)
HGB BLD-MCNC: 11.8 G/DL (ref 12–15.5)
HGB BLD-MCNC: 11.8 G/DL (ref 12–15.5)
IMM GRANULOCYTES # BLD AUTO: 0 K/MCL (ref 0–0.2)
IMM GRANULOCYTES # BLD AUTO: 0 K/MCL (ref 0–0.2)
IMM GRANULOCYTES NFR BLD: 0 %
IMM GRANULOCYTES NFR BLD: 0 %
LIPASE SERPL-CCNC: ABNORMAL UNITS/L (ref 73–393)
LIPASE SERPL-CCNC: ABNORMAL UNITS/L (ref 73–393)
LYMPHOCYTES # BLD: 0.5 K/MCL (ref 1–4.8)
LYMPHOCYTES # BLD: 0.6 K/MCL (ref 1–4.8)
LYMPHOCYTES NFR BLD: 10 %
LYMPHOCYTES NFR BLD: 11 %
MCH RBC QN AUTO: 37.6 PG (ref 26–34)
MCH RBC QN AUTO: 37.8 PG (ref 26–34)
MCHC RBC AUTO-ENTMCNC: 32.7 G/DL (ref 32–36.5)
MCHC RBC AUTO-ENTMCNC: 33 G/DL (ref 32–36.5)
MCV RBC AUTO: 114.7 FL (ref 78–100)
MCV RBC AUTO: 115 FL (ref 78–100)
MONOCYTES # BLD: 0.5 K/MCL (ref 0.3–0.9)
MONOCYTES # BLD: 0.5 K/MCL (ref 0.3–0.9)
MONOCYTES NFR BLD: 10 %
MONOCYTES NFR BLD: 9 %
NEUTROPHILS # BLD: 4.1 K/MCL (ref 1.8–7.7)
NEUTROPHILS # BLD: 4.1 K/MCL (ref 1.8–7.7)
NEUTROPHILS NFR BLD: 78 %
NEUTROPHILS NFR BLD: 78 %
NEUTS SEG NFR BLD: ABNORMAL %
NEUTS SEG NFR BLD: ABNORMAL %
NRBC (NRBCRE): 0 /100 WBC
NRBC (NRBCRE): 0 /100 WBC
PLATELET # BLD: 106 K/MCL (ref 140–450)
PLATELET # BLD: 106 K/MCL (ref 140–450)
POTASSIUM SERPL-SCNC: 3.9 MMOL/L (ref 3.4–5.1)
POTASSIUM SERPL-SCNC: 4 MMOL/L (ref 3.4–5.1)
PROT SERPL-MCNC: 5.5 G/DL (ref 6.4–8.2)
PROT SERPL-MCNC: 5.6 G/DL (ref 6.4–8.2)
RBC # BLD: 3.12 MIL/MCL (ref 4–5.2)
RBC # BLD: 3.14 MIL/MCL (ref 4–5.2)
SODIUM SERPL-SCNC: 138 MMOL/L (ref 135–145)
SODIUM SERPL-SCNC: 139 MMOL/L (ref 135–145)
VIT B12 SERPL-MCNC: 617 PG/ML (ref 211–911)
WBC # BLD: 5.2 K/MCL (ref 4.2–11)
WBC # BLD: 5.2 K/MCL (ref 4.2–11)

## 2020-01-11 LAB
ALBUMIN SERPL-MCNC: 2.5 G/DL (ref 3.6–5.1)
ALBUMIN/GLOB SERPL: 0.8 {RATIO} (ref 1–2.4)
ALP SERPL-CCNC: 60 UNITS/L (ref 45–117)
ALT SERPL-CCNC: 63 UNITS/L
ANALYZER ANC (IANC): ABNORMAL
ANION GAP SERPL CALC-SCNC: 14 MMOL/L (ref 10–20)
AST SERPL-CCNC: 84 UNITS/L
BASOPHILS # BLD: 0 K/MCL (ref 0–0.3)
BASOPHILS NFR BLD: 0 %
BILIRUB SERPL-MCNC: 1.9 MG/DL (ref 0.2–1)
BUN SERPL-MCNC: 5 MG/DL (ref 6–20)
BUN/CREAT SERPL: 9 (ref 7–25)
CALCIUM SERPL-MCNC: 7 MG/DL (ref 8.4–10.2)
CHLORIDE SERPL-SCNC: 104 MMOL/L (ref 98–107)
CO2 SERPL-SCNC: 22 MMOL/L (ref 21–32)
CREAT SERPL-MCNC: 0.56 MG/DL (ref 0.51–0.95)
DIFFERENTIAL METHOD BLD: ABNORMAL
EOSINOPHIL # BLD: 0.1 K/MCL (ref 0.1–0.5)
EOSINOPHIL NFR BLD: 1 %
ERYTHROCYTE [DISTWIDTH] IN BLOOD: 12.4 % (ref 11–15)
GLOBULIN SER-MCNC: 3.3 G/DL (ref 2–4)
GLUCOSE SERPL-MCNC: 71 MG/DL (ref 65–99)
HCT VFR BLD CALC: 34.9 % (ref 36–46.5)
HGB BLD-MCNC: 11.8 G/DL (ref 12–15.5)
IMM GRANULOCYTES # BLD AUTO: 0.1 K/MCL (ref 0–0.2)
IMM GRANULOCYTES NFR BLD: 1 %
LYMPHOCYTES # BLD: 0.7 K/MCL (ref 1–4.8)
LYMPHOCYTES NFR BLD: 9 %
MCH RBC QN AUTO: 38.3 PG (ref 26–34)
MCHC RBC AUTO-ENTMCNC: 33.8 G/DL (ref 32–36.5)
MCV RBC AUTO: 113.3 FL (ref 78–100)
MONOCYTES # BLD: 0.9 K/MCL (ref 0.3–0.9)
MONOCYTES NFR BLD: 14 %
NEUTROPHILS # BLD: 5.2 K/MCL (ref 1.8–7.7)
NEUTROPHILS NFR BLD: 75 %
NEUTS SEG NFR BLD: ABNORMAL %
NRBC (NRBCRE): 0 /100 WBC
PLATELET # BLD: 120 K/MCL (ref 140–450)
POTASSIUM SERPL-SCNC: 3.6 MMOL/L (ref 3.4–5.1)
PROT SERPL-MCNC: 5.8 G/DL (ref 6.4–8.2)
RBC # BLD: 3.08 MIL/MCL (ref 4–5.2)
SODIUM SERPL-SCNC: 136 MMOL/L (ref 135–145)
TRIGL SERPL-MCNC: 54 MG/DL
TRIGL SERPL-MCNC: NORMAL MG/DL
WBC # BLD: 6.9 K/MCL (ref 4.2–11)

## 2020-01-12 LAB
ALBUMIN SERPL-MCNC: 2.3 G/DL (ref 3.6–5.1)
ALBUMIN/GLOB SERPL: 0.7 {RATIO} (ref 1–2.4)
ALP SERPL-CCNC: 61 UNITS/L (ref 45–117)
ALT SERPL-CCNC: 50 UNITS/L
ANALYZER ANC (IANC): ABNORMAL
ANION GAP SERPL CALC-SCNC: 10 MMOL/L (ref 10–20)
AST SERPL-CCNC: 61 UNITS/L
BASOPHILS # BLD: 0 K/MCL (ref 0–0.3)
BASOPHILS NFR BLD: 0 %
BILIRUB SERPL-MCNC: 1.8 MG/DL (ref 0.2–1)
BUN SERPL-MCNC: 2 MG/DL (ref 6–20)
BUN/CREAT SERPL: 4 (ref 7–25)
CALCIUM SERPL-MCNC: 7.3 MG/DL (ref 8.4–10.2)
CHLORIDE SERPL-SCNC: 105 MMOL/L (ref 98–107)
CO2 SERPL-SCNC: 23 MMOL/L (ref 21–32)
CREAT SERPL-MCNC: 0.5 MG/DL (ref 0.51–0.95)
DIFFERENTIAL METHOD BLD: ABNORMAL
EOSINOPHIL # BLD: 0.1 K/MCL (ref 0.1–0.5)
EOSINOPHIL NFR BLD: 2 %
ERYTHROCYTE [DISTWIDTH] IN BLOOD: 12.1 % (ref 11–15)
GLOBULIN SER-MCNC: 3.5 G/DL (ref 2–4)
GLUCOSE SERPL-MCNC: 81 MG/DL (ref 65–99)
HCT VFR BLD CALC: 36.4 % (ref 36–46.5)
HGB BLD-MCNC: 12.2 G/DL (ref 12–15.5)
IMM GRANULOCYTES # BLD AUTO: 0.1 K/MCL (ref 0–0.2)
IMM GRANULOCYTES NFR BLD: 1 %
LIPASE SERPL-CCNC: 783 UNITS/L (ref 73–393)
LYMPHOCYTES # BLD: 0.7 K/MCL (ref 1–4.8)
LYMPHOCYTES NFR BLD: 10 %
MCH RBC QN AUTO: 36.7 PG (ref 26–34)
MCHC RBC AUTO-ENTMCNC: 33.5 G/DL (ref 32–36.5)
MCV RBC AUTO: 109.6 FL (ref 78–100)
MONOCYTES # BLD: 1.6 K/MCL (ref 0.3–0.9)
MONOCYTES NFR BLD: 23 %
NEUTROPHILS # BLD: 4.3 K/MCL (ref 1.8–7.7)
NEUTROPHILS NFR BLD: 64 %
NEUTS SEG NFR BLD: ABNORMAL %
NRBC (NRBCRE): 0 /100 WBC
PLATELET # BLD: 142 K/MCL (ref 140–450)
POTASSIUM SERPL-SCNC: 3.5 MMOL/L (ref 3.4–5.1)
PROCALCITONIN SERPL IA-MCNC: 0.55 NG/ML
PROCALCITONIN SERPL IA-MCNC: ABNORMAL NG/ML
PROT SERPL-MCNC: 5.8 G/DL (ref 6.4–8.2)
RBC # BLD: 3.32 MIL/MCL (ref 4–5.2)
SODIUM SERPL-SCNC: 135 MMOL/L (ref 135–145)
WBC # BLD: 6.8 K/MCL (ref 4.2–11)

## 2020-01-13 LAB
ALBUMIN SERPL-MCNC: 2.1 G/DL (ref 3.6–5.1)
ALP SERPL-CCNC: 51 UNITS/L (ref 45–117)
ALT SERPL-CCNC: 40 UNITS/L
ANALYZER ANC (IANC): ABNORMAL
ANION GAP SERPL CALC-SCNC: 10 MMOL/L (ref 10–20)
AST SERPL-CCNC: 44 UNITS/L
BASOPHILS # BLD: 0 K/MCL (ref 0–0.3)
BASOPHILS NFR BLD: 0 %
BILIRUB CONJ SERPL-MCNC: 0.6 MG/DL (ref 0–0.2)
BILIRUB SERPL-MCNC: 1.5 MG/DL (ref 0.2–1)
BUN SERPL-MCNC: 3 MG/DL (ref 6–20)
BUN/CREAT SERPL: 6 (ref 7–25)
CALCIUM SERPL-MCNC: 7.6 MG/DL (ref 8.4–10.2)
CHLORIDE SERPL-SCNC: 106 MMOL/L (ref 98–107)
CO2 SERPL-SCNC: 25 MMOL/L (ref 21–32)
CREAT SERPL-MCNC: 0.52 MG/DL (ref 0.51–0.95)
DIFFERENTIAL METHOD BLD: ABNORMAL
EOSINOPHIL # BLD: 0.1 K/MCL (ref 0.1–0.5)
EOSINOPHIL NFR BLD: 1 %
ERYTHROCYTE [DISTWIDTH] IN BLOOD: 12 % (ref 11–15)
GLUCOSE SERPL-MCNC: 93 MG/DL (ref 65–99)
HCT VFR BLD CALC: 30.6 % (ref 36–46.5)
HGB BLD-MCNC: 10.3 G/DL (ref 12–15.5)
LIPASE SERPL-CCNC: 308 UNITS/L (ref 73–393)
LYMPHOCYTES # BLD: 0.5 K/MCL (ref 1–4.8)
LYMPHOCYTES NFR BLD: 9 %
MCH RBC QN AUTO: 36.9 PG (ref 26–34)
MCHC RBC AUTO-ENTMCNC: 33.7 G/DL (ref 32–36.5)
MCV RBC AUTO: 109.7 FL (ref 78–100)
MONOCYTES # BLD: 1.5 K/MCL (ref 0.3–0.9)
MONOCYTES NFR BLD: 27 %
NEUTROPHILS # BLD: 3.5 K/MCL (ref 1.8–7.7)
NEUTS BAND NFR BLD: 4 % (ref 0–10)
NEUTS SEG NFR BLD: 59 %
NRBC (NRBCRE): 0 /100 WBC
PATH REV BLD -IMP: ABNORMAL
PLAT MORPH BLD: NORMAL
PLATELET # BLD: 176 K/MCL (ref 140–450)
POTASSIUM SERPL-SCNC: 3.2 MMOL/L (ref 3.4–5.1)
PROCALCITONIN SERPL IA-MCNC: 0.52 NG/ML
PROCALCITONIN SERPL IA-MCNC: ABNORMAL NG/ML
PROT SERPL-MCNC: 5.3 G/DL (ref 6.4–8.2)
RBC # BLD: 2.79 MIL/MCL (ref 4–5.2)
RBC MORPH BLD: NORMAL
SODIUM SERPL-SCNC: 138 MMOL/L (ref 135–145)
WBC # BLD: 5.6 K/MCL (ref 4.2–11)
WBC MORPH BLD: NORMAL

## 2020-01-14 LAB
AMORPH SED URNS QL MICRO: ABNORMAL
APPEARANCE UR: CLEAR
BACTERIA #/AREA URNS HPF: ABNORMAL /HPF
BILIRUB UR QL: NEGATIVE
CAOX CRY URNS QL MICRO: ABNORMAL
COLOR UR: YELLOW
CREAT SERPL-MCNC: 0.53 MG/DL (ref 0.51–0.95)
EPITH CASTS #/AREA URNS LPF: ABNORMAL /[LPF]
FATTY CASTS #/AREA URNS LPF: ABNORMAL /[LPF]
GLUCOSE UR-MCNC: NEGATIVE MG/DL
GRAN CASTS #/AREA URNS LPF: ABNORMAL /[LPF]
HGB UR QL: NEGATIVE
HYALINE CASTS #/AREA URNS LPF: ABNORMAL /LPF (ref 0–5)
KETONES UR-MCNC: 80 MG/DL
LEUKOCYTE ESTERASE UR QL STRIP: NEGATIVE
MIXED CELL CASTS #/AREA URNS LPF: ABNORMAL /[LPF]
MUCOUS THREADS URNS QL MICRO: PRESENT
NITRITE UR QL: NEGATIVE
PH UR: 7 UNITS (ref 5–7)
POTASSIUM SERPL-SCNC: 3.4 MMOL/L (ref 3.4–5.1)
PROT UR QL: NEGATIVE MG/DL
RBC #/AREA URNS HPF: ABNORMAL /HPF (ref 0–2)
RBC CASTS #/AREA URNS LPF: ABNORMAL /[LPF]
RENAL EPI CELLS #/AREA URNS HPF: ABNORMAL /[HPF]
SP GR UR: 1.01 (ref 1–1.03)
SPECIMEN SOURCE: ABNORMAL
SPERM URNS QL MICRO: ABNORMAL
SQUAMOUS #/AREA URNS HPF: ABNORMAL /HPF (ref 0–5)
T VAGINALIS URNS QL MICRO: ABNORMAL
TRI-PHOS CRY URNS QL MICRO: ABNORMAL
URATE CRY URNS QL MICRO: ABNORMAL
URINE REFLEX: ABNORMAL
URNS CMNT MICRO: ABNORMAL
UROBILINOGEN UR QL: 4 MG/DL (ref 0–1)
WAXY CASTS #/AREA URNS LPF: ABNORMAL /[LPF]
WBC #/AREA URNS HPF: ABNORMAL /HPF (ref 0–5)
WBC CASTS #/AREA URNS LPF: ABNORMAL /[LPF]
YEAST HYPHAE URNS QL MICRO: ABNORMAL
YEAST URNS QL MICRO: ABNORMAL

## 2020-01-15 LAB
CREAT SERPL-MCNC: 0.51 MG/DL (ref 0.51–0.95)
LEGIONELLA AB TITR SER IF: NORMAL {TITER}
POTASSIUM SERPL-SCNC: 3.5 MMOL/L (ref 3.4–5.1)

## 2020-01-16 LAB
ANALYZER ANC (IANC): ABNORMAL
BASOPHILS # BLD: 0 K/MCL (ref 0–0.3)
BASOPHILS NFR BLD: 0 %
CREAT SERPL-MCNC: 0.54 MG/DL (ref 0.51–0.95)
DIFFERENTIAL METHOD BLD: ABNORMAL
EOSINOPHIL # BLD: 0 K/MCL (ref 0.1–0.5)
EOSINOPHIL NFR BLD: 0 %
ERYTHROCYTE [DISTWIDTH] IN BLOOD: 13 % (ref 11–15)
HCT VFR BLD CALC: 31.9 % (ref 36–46.5)
HGB BLD-MCNC: 11.1 G/DL (ref 12–15.5)
LYMPHOCYTES # BLD: 2.2 K/MCL (ref 1–4.8)
LYMPHOCYTES NFR BLD: 15 %
MACROCYTOSIS (MACRO): ABNORMAL
MCH RBC QN AUTO: 37.5 PG (ref 26–34)
MCHC RBC AUTO-ENTMCNC: 34.8 G/DL (ref 32–36.5)
MCV RBC AUTO: 107.8 FL (ref 78–100)
METAMYELOCYTES NFR BLD: 1 % (ref 0–2)
MONOCYTES # BLD: 1.6 K/MCL (ref 0.3–0.9)
MONOCYTES NFR BLD: 11 %
NEUTROPHILS # BLD: 10.5 K/MCL (ref 1.8–7.7)
NEUTS BAND NFR BLD: 1 % (ref 0–10)
NEUTS SEG NFR BLD: 72 %
NRBC (NRBCRE): 0 /100 WBC
PATH REV BLD -IMP: ABNORMAL
PLAT MORPH BLD: NORMAL
PLATELET # BLD: 413 K/MCL (ref 140–450)
PLATELET # BLD: ABNORMAL 10*3/UL
POLYCHROMASIA (POLY): ABNORMAL
POTASSIUM SERPL-SCNC: 3.6 MMOL/L (ref 3.4–5.1)
RBC # BLD: 2.96 MIL/MCL (ref 4–5.2)
TOXIC GRANULATION (TOXIC): PRESENT
TOXIC VACUOLATION (TOXV): PRESENT
WBC # BLD: 14.4 K/MCL (ref 4.2–11)

## 2020-01-17 LAB
ALBUMIN SERPL-MCNC: 2.1 G/DL (ref 3.6–5.1)
ALBUMIN/GLOB SERPL: 0.6 {RATIO} (ref 1–2.4)
ALP SERPL-CCNC: 89 UNITS/L (ref 45–117)
ALT SERPL-CCNC: 30 UNITS/L
ANALYZER ANC (IANC): ABNORMAL
ANION GAP SERPL CALC-SCNC: 9 MMOL/L (ref 10–20)
AST SERPL-CCNC: 46 UNITS/L
BASOPHILS # BLD: 0.1 K/MCL (ref 0–0.3)
BASOPHILS NFR BLD: 1 %
BILIRUB SERPL-MCNC: 1.1 MG/DL (ref 0.2–1)
BUN SERPL-MCNC: 3 MG/DL (ref 6–20)
BUN/CREAT SERPL: 5 (ref 7–25)
CALCIUM SERPL-MCNC: 8.1 MG/DL (ref 8.4–10.2)
CHLORIDE SERPL-SCNC: 105 MMOL/L (ref 98–107)
CO2 SERPL-SCNC: 27 MMOL/L (ref 21–32)
CREAT SERPL-MCNC: 0.6 MG/DL (ref 0.51–0.95)
DIFFERENTIAL METHOD BLD: ABNORMAL
EOSINOPHIL # BLD: 0.1 K/MCL (ref 0.1–0.5)
EOSINOPHIL NFR BLD: 1 %
ERYTHROCYTE [DISTWIDTH] IN BLOOD: 13.4 % (ref 11–15)
GLOBULIN SER-MCNC: 3.8 G/DL (ref 2–4)
GLUCOSE SERPL-MCNC: 99 MG/DL (ref 65–99)
HCT VFR BLD CALC: 33.1 % (ref 36–46.5)
HGB BLD-MCNC: 10.9 G/DL (ref 12–15.5)
IMM GRANULOCYTES # BLD AUTO: 0.2 K/MCL (ref 0–0.2)
IMM GRANULOCYTES NFR BLD: 1 %
LYMPHOCYTES # BLD: 1 K/MCL (ref 1–4.8)
LYMPHOCYTES NFR BLD: 7 %
MCH RBC QN AUTO: 35.4 PG (ref 26–34)
MCHC RBC AUTO-ENTMCNC: 32.9 G/DL (ref 32–36.5)
MCV RBC AUTO: 107.5 FL (ref 78–100)
MONOCYTES # BLD: 1.8 K/MCL (ref 0.3–0.9)
MONOCYTES NFR BLD: 12 %
MRSA DNA SPEC QL NAA+PROBE: NORMAL
MRSA DNA SPEC QL NAA+PROBE: NORMAL
MRSA DNA SPEC QL NAA+PROBE: NOT DETECTED
NEUTROPHILS # BLD: 12.4 K/MCL (ref 1.8–7.7)
NEUTROPHILS NFR BLD: 78 %
NEUTS SEG NFR BLD: ABNORMAL %
NRBC (NRBCRE): 0 /100 WBC
PLATELET # BLD: 471 K/MCL (ref 140–450)
POTASSIUM SERPL-SCNC: 3.8 MMOL/L (ref 3.4–5.1)
PROT SERPL-MCNC: 5.9 G/DL (ref 6.4–8.2)
RBC # BLD: 3.08 MIL/MCL (ref 4–5.2)
SODIUM SERPL-SCNC: 137 MMOL/L (ref 135–145)
SPECIMEN SOURCE: NORMAL
STREP PNEUMONIAE ANTIGEN DETECTION: NORMAL
WBC # BLD: 15.7 K/MCL (ref 4.2–11)

## 2020-01-18 LAB
2009 H1N1 SUBTYPE (RF1N1): NOT DETECTED
ADENOVIRUS (RADENO): NOT DETECTED
BOCAVIRUS (RBOCA): NOT DETECTED
C. PNEUMONIAE (RCHLP): NOT DETECTED
CORONAVIRUS 229E (RC229E): NOT DETECTED
CORONAVIRUS HKU1 (RCHKU1): NOT DETECTED
CORONAVIRUS NL63 (RCNL63): NOT DETECTED
CORONAVIRUS OC43 (RCO43): NOT DETECTED
CREAT SERPL-MCNC: 0.53 MG/DL (ref 0.51–0.95)
INFLUENZA A SUBTYPE H1 (RFLH1): NOT DETECTED
INFLUENZA A SUBTYPE H3 (RFLH3): NOT DETECTED
INFLUENZA A UNSUBTYPABLE (RIAU): NORMAL
INFLUENZA B VIRUS (RFLUB): NOT DETECTED
M. PNEUMONIAE (RMYPP): NORMAL
M. PNEUMONIAE (RMYPP): NOT DETECTED
METAPNEUMOVIRUS (RMETA): NOT DETECTED
PARAINFLUENZA, TYPE 1 (RPAR1): NOT DETECTED
PARAINFLUENZA, TYPE 2 (RPAR2): NOT DETECTED
PARAINFLUENZA, TYPE 3 (RPAR3): NOT DETECTED
PARAINFLUENZA, TYPE 4 (RPAR4): NOT DETECTED
POTASSIUM SERPL-SCNC: 3.3 MMOL/L (ref 3.4–5.1)
RHINOVIRUS/ENTEROVIRUS (RRHINO): NOT DETECTED
RSV, SUBTYPE A (RRSVA): NOT DETECTED
RSV, SUBTYPE B (RRSVB): NOT DETECTED
SPECIMEN SOURCE: NORMAL

## 2020-01-19 LAB
ANALYZER ANC (IANC): ABNORMAL
ANION GAP SERPL CALC-SCNC: 9 MMOL/L (ref 10–20)
BASOPHILS # BLD: 0.1 K/MCL (ref 0–0.3)
BASOPHILS NFR BLD: 0 %
BUN SERPL-MCNC: 4 MG/DL (ref 6–20)
BUN/CREAT SERPL: 7 (ref 7–25)
CALCIUM SERPL-MCNC: 8.1 MG/DL (ref 8.4–10.2)
CHLORIDE SERPL-SCNC: 105 MMOL/L (ref 98–107)
CO2 SERPL-SCNC: 28 MMOL/L (ref 21–32)
CREAT SERPL-MCNC: 0.54 MG/DL (ref 0.51–0.95)
DIFFERENTIAL METHOD BLD: ABNORMAL
EOSINOPHIL # BLD: 0.2 K/MCL (ref 0.1–0.5)
EOSINOPHIL NFR BLD: 1 %
ERYTHROCYTE [DISTWIDTH] IN BLOOD: 14.2 % (ref 11–15)
GLUCOSE SERPL-MCNC: 111 MG/DL (ref 65–99)
HCT VFR BLD CALC: 31 % (ref 36–46.5)
HGB BLD-MCNC: 10.4 G/DL (ref 12–15.5)
IMM GRANULOCYTES # BLD AUTO: 0.2 K/MCL (ref 0–0.2)
IMM GRANULOCYTES NFR BLD: 2 %
LYMPHOCYTES # BLD: 1.2 K/MCL (ref 1–4.8)
LYMPHOCYTES NFR BLD: 9 %
MCH RBC QN AUTO: 36.9 PG (ref 26–34)
MCHC RBC AUTO-ENTMCNC: 33.5 G/DL (ref 32–36.5)
MCV RBC AUTO: 109.9 FL (ref 78–100)
MONOCYTES # BLD: 1.4 K/MCL (ref 0.3–0.9)
MONOCYTES NFR BLD: 10 %
NEUTROPHILS # BLD: 11 K/MCL (ref 1.8–7.7)
NEUTROPHILS NFR BLD: 78 %
NEUTS SEG NFR BLD: ABNORMAL %
NRBC (NRBCRE): 0 /100 WBC
PLATELET # BLD: 494 K/MCL (ref 140–450)
POTASSIUM SERPL-SCNC: 3.8 MMOL/L (ref 3.4–5.1)
RBC # BLD: 2.82 MIL/MCL (ref 4–5.2)
SODIUM SERPL-SCNC: 138 MMOL/L (ref 135–145)
WBC # BLD: 14 K/MCL (ref 4.2–11)

## 2020-01-21 LAB
BACTERIA BLD CULT: NORMAL

## 2020-01-24 PROBLEM — Z92.89 HISTORY OF BLOOD TRANSFUSION: Status: ACTIVE | Noted: 2020-01-24

## 2020-01-24 PROBLEM — G89.29 CHRONIC PAIN: Status: ACTIVE | Noted: 2020-01-24

## 2020-01-24 PROBLEM — H54.7 VISUAL IMPAIRMENT: Status: ACTIVE | Noted: 2020-01-24

## 2020-01-24 PROBLEM — D49.9 NEOPLASTIC DISEASE: Status: ACTIVE | Noted: 2020-01-24

## 2020-03-06 ENCOUNTER — TELEPHONE (OUTPATIENT)
Dept: SURGERY | Facility: CLINIC | Age: 40
End: 2020-03-06

## 2020-03-06 DIAGNOSIS — M40.14 OTHER SECONDARY KYPHOSIS, THORACIC REGION: ICD-10-CM

## 2020-03-06 DIAGNOSIS — M54.40 BILATERAL LOW BACK PAIN WITH SCIATICA, SCIATICA LATERALITY UNSPECIFIED, UNSPECIFIED CHRONICITY: Primary | ICD-10-CM

## 2020-03-06 DIAGNOSIS — M54.9 INTRACTABLE BACK PAIN: ICD-10-CM

## 2020-03-06 NOTE — TELEPHONE ENCOUNTER
pt calling asking for an order for p.t. for her thoracic/Lumbar. Pt states she is in constant pain. She felt better when she was in pt. in past & wanted to go back. Please advise.

## 2020-03-09 ENCOUNTER — APPOINTMENT (OUTPATIENT)
Dept: CT IMAGING | Age: 40
End: 2020-03-09
Attending: EMERGENCY MEDICINE

## 2020-03-09 ENCOUNTER — APPOINTMENT (OUTPATIENT)
Dept: GENERAL RADIOLOGY | Age: 40
End: 2020-03-09
Attending: EMERGENCY MEDICINE

## 2020-03-09 ENCOUNTER — HOSPITAL ENCOUNTER (EMERGENCY)
Age: 40
Discharge: HOME | End: 2020-03-09
Attending: EMERGENCY MEDICINE

## 2020-03-09 VITALS
RESPIRATION RATE: 16 BRPM | OXYGEN SATURATION: 100 % | SYSTOLIC BLOOD PRESSURE: 95 MMHG | HEIGHT: 63 IN | TEMPERATURE: 97.2 F | BODY MASS INDEX: 23.12 KG/M2 | WEIGHT: 130.51 LBS | HEART RATE: 87 BPM | DIASTOLIC BLOOD PRESSURE: 73 MMHG

## 2020-03-09 DIAGNOSIS — F10.929 ACUTE ALCOHOLIC INTOXICATION WITH COMPLICATION (CMD): Primary | ICD-10-CM

## 2020-03-09 LAB
ALBUMIN SERPL-MCNC: 3.8 G/DL (ref 3.6–5.1)
ALBUMIN/GLOB SERPL: 1 {RATIO} (ref 1–2.4)
ALP SERPL-CCNC: 51 UNITS/L (ref 45–117)
ALT SERPL-CCNC: 20 UNITS/L
ANION GAP SERPL CALC-SCNC: 10 MMOL/L (ref 10–20)
AST SERPL-CCNC: 24 UNITS/L
ATRIAL RATE (BPM): 62
BASOPHILS # BLD: 0 K/MCL (ref 0–0.3)
BASOPHILS NFR BLD: 0 %
BILIRUB SERPL-MCNC: 1 MG/DL (ref 0.2–1)
BUN SERPL-MCNC: 12 MG/DL (ref 6–20)
BUN/CREAT SERPL: 19 (ref 7–25)
CALCIUM SERPL-MCNC: 8.7 MG/DL (ref 8.4–10.2)
CHLORIDE SERPL-SCNC: 105 MMOL/L (ref 98–107)
CO2 SERPL-SCNC: 25 MMOL/L (ref 21–32)
CREAT SERPL-MCNC: 0.62 MG/DL (ref 0.51–0.95)
DIFFERENTIAL METHOD BLD: ABNORMAL
EOSINOPHIL # BLD: 0.1 K/MCL (ref 0.1–0.5)
EOSINOPHIL NFR BLD: 2 %
ERYTHROCYTE [DISTWIDTH] IN BLOOD: 12.4 % (ref 11–15)
ERYTHROCYTE [SEDIMENTATION RATE] IN BLOOD BY WESTERGREN METHOD: 63 MM/HR (ref 0–20)
ETHANOL SERPL-MCNC: 351 MG/DL
GLOBULIN SER-MCNC: 3.9 G/DL (ref 2–4)
GLUCOSE SERPL-MCNC: 115 MG/DL (ref 65–99)
HCT VFR BLD CALC: 37.1 % (ref 36–46.5)
HGB BLD-MCNC: 12.4 G/DL (ref 12–15.5)
IMM GRANULOCYTES # BLD AUTO: 0 K/MCL (ref 0–0.2)
IMM GRANULOCYTES NFR BLD: 0 %
LIPASE SERPL-CCNC: 180 UNITS/L (ref 73–393)
LYMPHOCYTES # BLD: 2 K/MCL (ref 1–4.8)
LYMPHOCYTES NFR BLD: 35 %
MAGNESIUM SERPL-MCNC: 2.4 MG/DL (ref 1.7–2.4)
MCH RBC QN AUTO: 32.5 PG (ref 26–34)
MCHC RBC AUTO-ENTMCNC: 33.4 G/DL (ref 32–36.5)
MCV RBC AUTO: 97.1 FL (ref 78–100)
MONOCYTES # BLD: 0.5 K/MCL (ref 0.3–0.9)
MONOCYTES NFR BLD: 8 %
NEUTROPHILS # BLD: 3 K/MCL (ref 1.8–7.7)
NEUTROPHILS NFR BLD: 55 %
NRBC BLD MANUAL-RTO: 0 /100 WBC
P AXIS (DEGREES): 45
PLATELET # BLD: 253 K/MCL (ref 140–450)
POTASSIUM SERPL-SCNC: 3.4 MMOL/L (ref 3.4–5.1)
PR-INTERVAL (MSEC): 136
PROT SERPL-MCNC: 7.7 G/DL (ref 6.4–8.2)
QRS-INTERVAL (MSEC): 72
QT-INTERVAL (MSEC): 450
QTC: 457
R AXIS (DEGREES): 42
RBC # BLD: 3.82 MIL/MCL (ref 4–5.2)
REPORT TEXT: NORMAL
SODIUM SERPL-SCNC: 137 MMOL/L (ref 135–145)
T AXIS (DEGREES): 43
TROPONIN I SERPL HS-MCNC: <0.02 NG/ML
VENTRICULAR RATE EKG/MIN (BPM): 62
WBC # BLD: 5.6 K/MCL (ref 4.2–11)

## 2020-03-09 PROCEDURE — 93005 ELECTROCARDIOGRAM TRACING: CPT | Performed by: EMERGENCY MEDICINE

## 2020-03-09 PROCEDURE — 80053 COMPREHEN METABOLIC PANEL: CPT

## 2020-03-09 PROCEDURE — 85025 COMPLETE CBC W/AUTO DIFF WBC: CPT

## 2020-03-09 PROCEDURE — 85652 RBC SED RATE AUTOMATED: CPT

## 2020-03-09 PROCEDURE — 84484 ASSAY OF TROPONIN QUANT: CPT

## 2020-03-09 PROCEDURE — 80320 DRUG SCREEN QUANTALCOHOLS: CPT

## 2020-03-09 PROCEDURE — 96361 HYDRATE IV INFUSION ADD-ON: CPT

## 2020-03-09 PROCEDURE — 96360 HYDRATION IV INFUSION INIT: CPT

## 2020-03-09 PROCEDURE — 10002807 HB RX 258: Performed by: EMERGENCY MEDICINE

## 2020-03-09 PROCEDURE — 99285 EMERGENCY DEPT VISIT HI MDM: CPT

## 2020-03-09 PROCEDURE — 71101 X-RAY EXAM UNILAT RIBS/CHEST: CPT

## 2020-03-09 PROCEDURE — 83690 ASSAY OF LIPASE: CPT

## 2020-03-09 PROCEDURE — 70450 CT HEAD/BRAIN W/O DYE: CPT

## 2020-03-09 PROCEDURE — 83735 ASSAY OF MAGNESIUM: CPT

## 2020-03-09 RX ORDER — LEVETIRACETAM 1000 MG/1
1000 TABLET ORAL 2 TIMES DAILY
COMMUNITY

## 2020-03-09 RX ADMIN — SODIUM CHLORIDE 1000 ML: 0.9 INJECTION, SOLUTION INTRAVENOUS at 16:04

## 2020-03-09 NOTE — TELEPHONE ENCOUNTER
S: Pain has returned. B: LOV 9/2019 for indradural tumors, PT initiated at that time. A: Sohan Pierce reports that she had completed physical therapy in November and had good reponse.  Since she completed PT, she would have \"good days and bad days\" but re

## 2020-03-21 DIAGNOSIS — G40.909 SEIZURE DISORDER (HCC): Primary | ICD-10-CM

## 2020-03-23 RX ORDER — LEVETIRACETAM 750 MG/1
TABLET, EXTENDED RELEASE ORAL
Qty: 90 TABLET | Refills: 0 | Status: SHIPPED | OUTPATIENT
Start: 2020-03-23 | End: 2020-07-14

## 2020-03-23 NOTE — TELEPHONE ENCOUNTER
Medication: LEVETIRACETAM  MG Oral Tablet 24 Hr    Date of last refill: 09/24/19 (#30/5)  [place]    Last office visit: 9/24/2019  Due back to clinic per last office note:  Around 03/24/2020  Date next office v

## 2020-05-27 ENCOUNTER — TELEPHONE (OUTPATIENT)
Dept: SURGERY | Facility: CLINIC | Age: 40
End: 2020-05-27

## 2020-06-02 PROBLEM — H50.53 VERTICAL HETEROPHORIA: Status: ACTIVE | Noted: 2020-06-02

## 2020-06-02 PROBLEM — H40.053 OCULAR HYPERTENSION, BILATERAL: Status: ACTIVE | Noted: 2020-06-02

## 2020-06-30 ENCOUNTER — OFFICE VISIT (OUTPATIENT)
Dept: SURGERY | Facility: CLINIC | Age: 40
End: 2020-06-30
Payer: COMMERCIAL

## 2020-06-30 VITALS — HEART RATE: 74 BPM | SYSTOLIC BLOOD PRESSURE: 120 MMHG | DIASTOLIC BLOOD PRESSURE: 70 MMHG

## 2020-06-30 DIAGNOSIS — R20.2 BILATERAL NUMBNESS AND TINGLING OF ARMS AND LEGS: ICD-10-CM

## 2020-06-30 DIAGNOSIS — M40.14 OTHER SECONDARY KYPHOSIS, THORACIC REGION: ICD-10-CM

## 2020-06-30 DIAGNOSIS — C41.9 CHONDROSARCOMA (HCC): ICD-10-CM

## 2020-06-30 DIAGNOSIS — M54.9 INTRACTABLE BACK PAIN: Primary | ICD-10-CM

## 2020-06-30 DIAGNOSIS — R20.0 BILATERAL NUMBNESS AND TINGLING OF ARMS AND LEGS: ICD-10-CM

## 2020-06-30 PROCEDURE — 99213 OFFICE O/P EST LOW 20 MIN: CPT | Performed by: PHYSICIAN ASSISTANT

## 2020-06-30 RX ORDER — CYCLOBENZAPRINE HCL 10 MG
10 TABLET ORAL 3 TIMES DAILY PRN
Qty: 60 TABLET | Refills: 2 | Status: SHIPPED | OUTPATIENT
Start: 2020-06-30

## 2020-06-30 NOTE — PROGRESS NOTES
Neurosurgery Clinic Visit  2020    [address] PCP:  Juany Guadalupe MD    1980 MRN [id number]     HISTORY OF PRESENT ILLNESS:  Gregoria Garner is a(n) 38year old female who is here for follow-up for brain and spinal cord mario alberto Stefani - complicated by right partial III nerve palsy and seizure disorder. 3.  S/p thoracic chondrosarcoma resection at [facility]. 4.  Back pain. 5.  Bilateral arm numbness/tingling. Imaging discussed with the patient.   Her back pain see

## 2020-06-30 NOTE — PROGRESS NOTES
Pt is here for follow up. Pt was last seen in the office 9/5/19     Pt states that she is still currently in PT. Pt states that she has been going to PT 2 times weekly.      Pt states that she has been having more stabbing pain in the thoracic more so on th

## 2020-07-13 ENCOUNTER — OFFICE VISIT (OUTPATIENT)
Dept: PAIN CLINIC | Facility: CLINIC | Age: 40
End: 2020-07-13
Payer: COMMERCIAL

## 2020-07-13 ENCOUNTER — TELEPHONE (OUTPATIENT)
Dept: PAIN CLINIC | Facility: CLINIC | Age: 40
End: 2020-07-13

## 2020-07-13 VITALS — WEIGHT: 123 LBS | BODY MASS INDEX: 20.49 KG/M2 | HEIGHT: 65 IN

## 2020-07-13 DIAGNOSIS — M54.9 INTRACTABLE BACK PAIN: Primary | ICD-10-CM

## 2020-07-13 DIAGNOSIS — G89.3 CHRONIC PAIN DUE TO NEOPLASM: ICD-10-CM

## 2020-07-13 DIAGNOSIS — M40.14 OTHER SECONDARY KYPHOSIS, THORACIC REGION: ICD-10-CM

## 2020-07-13 DIAGNOSIS — G40.909 SEIZURE DISORDER (HCC): ICD-10-CM

## 2020-07-13 PROCEDURE — 99203 OFFICE O/P NEW LOW 30 MIN: CPT | Performed by: ANESTHESIOLOGY

## 2020-07-13 RX ORDER — LEVETIRACETAM 750 MG/1
TABLET, EXTENDED RELEASE ORAL
Qty: 90 TABLET | Refills: 0 | Status: CANCELLED | OUTPATIENT
Start: 2020-07-13

## 2020-07-13 NOTE — PROGRESS NOTES
Prior authorization required for recommended procedure. Patient left without receiving pre-procedure instructions.

## 2020-07-13 NOTE — TELEPHONE ENCOUNTER
Spoke with Concepcion Cueto at [facility] [phone]  Reference #:[id number]    OZBC:12:86    Per [employer] is Required for Right Thoracic FACET ([id number]) via [facility].        Spoke with Paris Neri at [facility] [phone]  Time:25:28    Case create

## 2020-07-13 NOTE — H&P
Name: Kostas Nava   : 1980   DOS: 2020     Chief complaint: Right-sided thoracic spine pain    History of present illness:  Kostas Nava is a 38year old female status is post resection for intradural tumor in the thoracic and Thistle 250 MG Oral Cap Take by mouth 3 (three) times daily. • Calcium Carbonate-Vit D-Min (CALCIUM 1200 OR) Take 1 tablet by mouth daily. • Multiple Vitamins-Minerals (MULTIVITAMIN OR) Take 1 tablet by mouth daily.      • Magnesium 500 MG Oral Tab kg)   BMI 20.47 kg/m²    The patient is awake, alert, oriented and corporative. She has a normal affect. The patient ambulates with normal gait. HEENT: No gross lesion noted. PEERL. No icterus. Neck and Upper Extremity: Supple.  No thyromegaly or lymphade and patient would like to move forward. All questions addressed.     Alejo Mon MD  Pain Management

## 2020-07-13 NOTE — PROGRESS NOTES
PHYSICAL EXAM:   Physical Exam   Constitutional:           Patient presents in office today with reported pain in right scapula and right side middle back    Current pain level reported = 7/10      Location of Pain: Right scapula and right side of midbac

## 2020-07-13 NOTE — TELEPHONE ENCOUNTER
Medical clearance needed- No    Implanted cardiac device/SCS/PNS: NA    Pt seen in OV today by Dr. Jaqueline Elliott and recommended for Facet (X 1). Please begin PA process for procedure(s).      Laterality: Right  Level(s): T9, 10, 11, 12    Pt informed callback will

## 2020-07-14 RX ORDER — LEVETIRACETAM 750 MG/1
TABLET, EXTENDED RELEASE ORAL
Qty: 90 TABLET | Refills: 0 | Status: SHIPPED | OUTPATIENT
Start: 2020-07-14 | End: 2020-10-09

## 2020-07-14 NOTE — TELEPHONE ENCOUNTER
Medication: Levetiracetam 750mg    Date of last refill: 3/23/20 (#90/0)  [place]     Last office visit: 9/24/19  Due back to clinic per last office note:  6 months  Date next office visit scheduled:    Future Appointment

## 2020-07-17 ENCOUNTER — TELEPHONE (OUTPATIENT)
Dept: SURGERY | Facility: CLINIC | Age: 40
End: 2020-07-17

## 2020-07-20 NOTE — TELEPHONE ENCOUNTER
Spoke with Shashank Lowe and requested to schedule Peer to Peer. Frances Hampton stated Physician reviewer is available now if Dr Yamilex Ramsey is available. Informed Frances Ramsey is on a video visit with patient and requested to schedule peer to peer at a later time.

## 2020-07-20 NOTE — TELEPHONE ENCOUNTER
Right Thoracic FACET ([id number]) Denied     Denial Reason:    Your doctor told us that you have mid-back pain. Your doctor wants to give you an injection in between the bones of your mid-back.  This injection can be done in the neck or in the low ba

## 2020-07-28 ENCOUNTER — TELEPHONE (OUTPATIENT)
Dept: PAIN CLINIC | Facility: CLINIC | Age: 40
End: 2020-07-28

## 2020-07-28 NOTE — TELEPHONE ENCOUNTER
Spoke to [employer] regarding [id number]-hq-bmcr to try to get authorization for right-sided thoracic facet injections. The oupx-wh-irur physician reviewer informed me that the case was closed on July 16, 2020. He is unable to open the case.   This is absurd .

## 2020-07-28 NOTE — TELEPHONE ENCOUNTER
Called number provided to try and initiate tonf-pw-fhyf. On hold for greater than 20 minutes. Will attempt again.

## 2020-08-06 ENCOUNTER — PROCEDURE VISIT (OUTPATIENT)
Dept: NEUROLOGY | Facility: CLINIC | Age: 40
End: 2020-08-06
Payer: COMMERCIAL

## 2020-08-06 DIAGNOSIS — R20.9 DISTURBANCE OF SKIN SENSATION: ICD-10-CM

## 2020-08-06 PROCEDURE — 95886 MUSC TEST DONE W/N TEST COMP: CPT | Performed by: OTHER

## 2020-08-06 PROCEDURE — 95913 NRV CNDJ TEST 13/> STUDIES: CPT | Performed by: OTHER

## 2020-08-06 NOTE — PROCEDURES
[facility]  [address] [facility] [address] [facility]  Phone- [phone]  Nerve Conduction & Electromyography Report            Patient: Charlotte Brandon [facility]  Patient ID: [id number]  Sex: Female  YOB: 1980  Age Wrist APB 3.75 ?4.40 6.8 ?4.0 100  62.9 ?50.00 Wrist - APB 7        Elbow APB 6.98  5.4  78.7 ?70 48.9  Elbow - Wrist 18 56 ?49   L MEDIAN - APB      Wrist APB 3.23 ?4.40 10.9 ?4.0 100  100 ?50.00 Wrist - APB 7        Elbow APB 6.35  11.0  101 ?115 100 R. CERV PSP C5-C6 N None None None None       R. TIB ANTERIOR N None None None None N N N N   R. GASTROCN (MED) N None None None None N N N N   R. PERON LONGUS N None None None None N N N N   R. VAST MEDIALIS N None None None None N N N N   R. LUMB PSP L4-

## 2020-08-07 NOTE — TELEPHONE ENCOUNTER
See TE 7/28/20    Harris Medeiros MD        7/28/20 2:48 PM   Note      Spoke to [employer] regarding [id number] to try to get authorization for right-sided thoracic facet injections.   The wztq-ac-otyk physician reviewer informed me that the case was alena

## 2020-08-07 NOTE — TELEPHONE ENCOUNTER
Spoke with Jessica Anne @ [employer] to reschedule a peer to peer.      She stated peer to peer cannot be completed for this case anymore as the time has lapsed     Jessica Anne stated the case may be restarted and does not have to wait for 45 days

## 2020-08-17 ENCOUNTER — APPOINTMENT (OUTPATIENT)
Dept: LAB | Age: 40
End: 2020-08-17
Attending: Other
Payer: COMMERCIAL

## 2020-08-17 ENCOUNTER — OFFICE VISIT (OUTPATIENT)
Dept: NEUROLOGY | Facility: CLINIC | Age: 40
End: 2020-08-17
Payer: COMMERCIAL

## 2020-08-17 VITALS
DIASTOLIC BLOOD PRESSURE: 72 MMHG | RESPIRATION RATE: 16 BRPM | SYSTOLIC BLOOD PRESSURE: 118 MMHG | HEART RATE: 70 BPM | BODY MASS INDEX: 21 KG/M2 | WEIGHT: 125 LBS

## 2020-08-17 DIAGNOSIS — R20.9 DISTURBANCE OF SKIN SENSATION: ICD-10-CM

## 2020-08-17 DIAGNOSIS — G40.909 SEIZURE DISORDER (HCC): Primary | ICD-10-CM

## 2020-08-17 PROCEDURE — 3078F DIAST BP <80 MM HG: CPT | Performed by: OTHER

## 2020-08-17 PROCEDURE — 86038 ANTINUCLEAR ANTIBODIES: CPT | Performed by: OTHER

## 2020-08-17 PROCEDURE — 3074F SYST BP LT 130 MM HG: CPT | Performed by: OTHER

## 2020-08-17 PROCEDURE — 99213 OFFICE O/P EST LOW 20 MIN: CPT | Performed by: OTHER

## 2020-08-17 PROCEDURE — 36415 COLL VENOUS BLD VENIPUNCTURE: CPT | Performed by: OTHER

## 2020-08-17 NOTE — PROGRESS NOTES
HPI:    Patient ID: Juhi Campos is a 38year old female. Seizures   Associated symptoms include numbness. Pertinent negatives include no headaches. Patient presented for follow up for seizure disorder.  She reports no seizure or aura sympt tumor removal    • OTHER SURGICAL HISTORY  2008    spine tumor removal   • OTHER SURGICAL HISTORY  2005    eye surgery   • OTHER SURGICAL HISTORY  4/24/2015    PARTIAL LUMBAR 2, LUMBAR 3 LAMINECTOMY, PARTIAL LUMBAR 5, PARTIAL SACRAL 1 LAMINECTOMY , RESECTI Multiple Vitamins-Minerals (MULTIVITAMIN OR) Take 1 tablet by mouth daily. • Magnesium 500 MG Oral Tab Take 1 tablet by mouth daily. • Glucosamine HCl-Glucosamin SO4 (GLUCOSAMINE COMPLEX OR) Take 1 tablet by mouth daily.        Allergies:  Sandra Blancas 60 median neuropathy noted  Follows with NS and Pain medicine    Raynaud's phenomenon: check SHAWANDA profile      Follow up in 6 months.     Orders Placed This Encounter      SHAWANDA, Direct, Reflex To 9 Enas [[facility] Only]      Meds This Visit:  Requested Felipa [phone]

## 2020-08-19 LAB — ANA SCREEN: NEGATIVE

## 2020-08-20 ENCOUNTER — TELEPHONE (OUTPATIENT)
Dept: NEUROLOGY | Facility: CLINIC | Age: 40
End: 2020-08-20

## 2020-08-20 DIAGNOSIS — R20.9 DISTURBANCE OF SKIN SENSATION: Primary | ICD-10-CM

## 2020-08-20 NOTE — TELEPHONE ENCOUNTER
Order placed for Rheumatology consult. [employer] message sent to patient with the below results, along with rheumatology contact info.       ----- Message from Jenny Servin MD sent at 8/20/2020 12:16 PM CDT -----  Negative SHAWANDA profile.  See Rheumatology

## 2020-09-16 ENCOUNTER — TELEPHONE (OUTPATIENT)
Dept: SURGERY | Facility: CLINIC | Age: 40
End: 2020-09-16

## 2020-10-09 DIAGNOSIS — G40.909 SEIZURE DISORDER (HCC): ICD-10-CM

## 2020-10-09 RX ORDER — LEVETIRACETAM 750 MG/1
TABLET, EXTENDED RELEASE ORAL
Qty: 90 TABLET | Refills: 1 | Status: SHIPPED | OUTPATIENT
Start: 2020-10-09 | End: 2021-03-24

## 2020-10-09 NOTE — TELEPHONE ENCOUNTER
Medication: LEVETIRACETAM  MG Oral Tablet 24 Hr    Date of last refill: 07/14/2020 (#90/0)  [place]    Last office visit: 8/17/2020  Due back to clinic per last office note:  Around 02/17/2021  Date next office

## 2020-10-12 ENCOUNTER — TELEPHONE (OUTPATIENT)
Dept: SURGERY | Facility: CLINIC | Age: 40
End: 2020-10-12

## 2020-10-13 NOTE — TELEPHONE ENCOUNTER
Received progress note from pt visit on 10-8 signed by Dr Juan Jose Wan. Faxed to [facility] at [phone].

## 2020-10-19 NOTE — TELEPHONE ENCOUNTER
Resubmitted PA via [facility] for Right T9-12 FACET ([id number]) , Clinical Information faxed to [employer] at Fax #:[phone];confirmation received.

## 2020-10-26 NOTE — TELEPHONE ENCOUNTER
Right T9-12 FACET ([id number]) Denied     Denial Reason:  Case #: [id number]    Your doctor told us that you have mid-back pain. Your doctor wants to give you an injection in between the bones of your mid-back.  This injection can be done in the neck

## 2020-10-28 NOTE — TELEPHONE ENCOUNTER
Attempted to reach [employer] to schedule peer to peer and was placed on hold for 20 minutes. Will try again at a later time.

## 2020-11-02 ENCOUNTER — TELEPHONE (OUTPATIENT)
Dept: PAIN CLINIC | Facility: CLINIC | Age: 40
End: 2020-11-02

## 2020-11-02 NOTE — TELEPHONE ENCOUNTER
Attempted to reach [employer] to schedule peer to peer again and was placed on hold for 10 minutes     Spoke with Cielo @ [employer]. Cielo stated peer to peer cannot be scheduled, provider can complete peer to peer with physician reviewer now if available.       Spoke

## 2020-11-02 NOTE — TELEPHONE ENCOUNTER
Completed [id number]-[id number]-xwmr. Unfortunately, [employer] cannot reopen the case given that this is outside the 10-day approval window. I suggested patient call her insurance directly.

## 2020-11-09 NOTE — TELEPHONE ENCOUNTER
Received Denial Letter from [facility]     Since Peer to Peer was Denied     Next option is Appeal     Send Appeal via Mail to .[facility].  [phone], [address] [address]    *Appeals of adverse decisions are resolved and a written respon

## 2020-11-09 NOTE — TELEPHONE ENCOUNTER
Denis Ballesteros MD  You 4 minutes ago (10:53 AM)     Patient has to initiate appeal with her health plan. They do no approve of thoracic facet injections so all will be denied.  Peer to peer has to be scheduled at time of appeal because her health plan will cl

## 2020-11-09 NOTE — TELEPHONE ENCOUNTER
Per denial below - This injection should not be done in the mid-back. For this reason, this injection is not medically necessary for you at this time.      Provider to advise on next steps- Appeal ?

## 2020-11-11 NOTE — TELEPHONE ENCOUNTER
Spoke with patient and provided recommendations per Dr Thony Santana and Insurance below . Patient voiced understanding and stated she will contact insurance and provide an update.

## 2021-03-24 DIAGNOSIS — G40.909 SEIZURE DISORDER (HCC): ICD-10-CM

## 2021-03-24 RX ORDER — LEVETIRACETAM 750 MG/1
TABLET, EXTENDED RELEASE ORAL
Qty: 90 TABLET | Refills: 0 | Status: SHIPPED | OUTPATIENT
Start: 2021-03-24 | End: 2021-06-28

## 2021-03-24 NOTE — TELEPHONE ENCOUNTER
Medication: LEVETIRACETAM  MG Oral Tablet 24 Hr    Date of last refill: 10/09/2020 (#90/1)  [place]    Last office visit: 08/17/2020  Due back to clinic per last office note:  Around 02/17/2021  Date next offic

## 2021-06-28 ENCOUNTER — OFFICE VISIT (OUTPATIENT)
Dept: NEUROLOGY | Facility: CLINIC | Age: 41
End: 2021-06-28
Payer: COMMERCIAL

## 2021-06-28 ENCOUNTER — LAB ENCOUNTER (OUTPATIENT)
Dept: LAB | Age: 41
End: 2021-06-28
Attending: Other
Payer: COMMERCIAL

## 2021-06-28 VITALS
WEIGHT: 117 LBS | RESPIRATION RATE: 14 BRPM | BODY MASS INDEX: 20 KG/M2 | DIASTOLIC BLOOD PRESSURE: 70 MMHG | SYSTOLIC BLOOD PRESSURE: 110 MMHG | HEART RATE: 64 BPM

## 2021-06-28 DIAGNOSIS — R20.2 PARESTHESIA OF BILATERAL LEGS: ICD-10-CM

## 2021-06-28 DIAGNOSIS — G40.909 SEIZURE DISORDER (HCC): Primary | ICD-10-CM

## 2021-06-28 PROCEDURE — 99213 OFFICE O/P EST LOW 20 MIN: CPT | Performed by: OTHER

## 2021-06-28 PROCEDURE — 82607 VITAMIN B-12: CPT | Performed by: OTHER

## 2021-06-28 PROCEDURE — 3074F SYST BP LT 130 MM HG: CPT | Performed by: OTHER

## 2021-06-28 PROCEDURE — 3078F DIAST BP <80 MM HG: CPT | Performed by: OTHER

## 2021-06-28 RX ORDER — LEVETIRACETAM 750 MG/1
1 TABLET, EXTENDED RELEASE ORAL NIGHTLY
Qty: 90 TABLET | Refills: 2 | Status: SHIPPED | OUTPATIENT
Start: 2021-06-28

## 2021-06-28 NOTE — PROGRESS NOTES
Patient denies recent seizures or seizure like activity. Patient denies changes to memory, speech or balance.

## 2021-06-28 NOTE — PROGRESS NOTES
HPI:    Patient ID: Merline Ana is a 42year old female. Patient presented for follow up for seizure disorder. She is in clinical remission and had no seizure activity since 2003.   She reports since last office visit paresthesias had improved Family History   Problem Relation Age of Onset   • Cancer Mother 61        Cervical CA   • Heart Disorder Mother    • Heart Disorder Father       Social History    Tobacco Use      Smoking status: Former Smoker        Packs/day: 0.10        Types: Armando Correa 14, weight 117 lb (53.1 kg), not currently breastfeeding. General: A 42year old female, well developed, well nourished  HEENT: Normocephalic and atraumatic. Normal sclera  Cardiovascular: Normal rate, regular rhythm and normal heart sounds.     Pulmonary/ tablet 2     Sig: Take 1 tablet (750 mg total) by mouth nightly.        Imaging & Referrals:  None       #[id number]

## 2021-08-25 ENCOUNTER — OFFICE VISIT (OUTPATIENT)
Dept: SURGERY | Facility: CLINIC | Age: 41
End: 2021-08-25
Payer: COMMERCIAL

## 2021-08-25 ENCOUNTER — TELEPHONE (OUTPATIENT)
Dept: SURGERY | Facility: CLINIC | Age: 41
End: 2021-08-25

## 2021-08-25 VITALS
DIASTOLIC BLOOD PRESSURE: 68 MMHG | HEART RATE: 96 BPM | HEIGHT: 64 IN | OXYGEN SATURATION: 100 % | BODY MASS INDEX: 19.97 KG/M2 | RESPIRATION RATE: 16 BRPM | SYSTOLIC BLOOD PRESSURE: 90 MMHG | WEIGHT: 117 LBS

## 2021-08-25 DIAGNOSIS — M62.81 MUSCLE WEAKNESS ON EXAMINATION: ICD-10-CM

## 2021-08-25 DIAGNOSIS — M54.16 LUMBAR RADICULOPATHY: Primary | ICD-10-CM

## 2021-08-25 DIAGNOSIS — M21.371 RIGHT FOOT DROP: ICD-10-CM

## 2021-08-25 DIAGNOSIS — Z98.890 HX OF LUMBOSACRAL SPINE SURGERY: ICD-10-CM

## 2021-08-25 DIAGNOSIS — R29.898 WEAKNESS OF RIGHT FOOT: ICD-10-CM

## 2021-08-25 PROCEDURE — 3078F DIAST BP <80 MM HG: CPT | Performed by: PHYSICIAN ASSISTANT

## 2021-08-25 PROCEDURE — 3074F SYST BP LT 130 MM HG: CPT | Performed by: PHYSICIAN ASSISTANT

## 2021-08-25 PROCEDURE — 99214 OFFICE O/P EST MOD 30 MIN: CPT | Performed by: PHYSICIAN ASSISTANT

## 2021-08-25 PROCEDURE — 3008F BODY MASS INDEX DOCD: CPT | Performed by: PHYSICIAN ASSISTANT

## 2021-08-25 RX ORDER — LORAZEPAM 1 MG/1
1 TABLET ORAL SEE ADMIN INSTRUCTIONS
Qty: 3 TABLET | Refills: 0 | Status: SHIPPED | OUTPATIENT
Start: 2021-08-25

## 2021-08-25 RX ORDER — NORTRIPTYLINE HYDROCHLORIDE 10 MG/1
CAPSULE ORAL
COMMUNITY
Start: 2021-08-05

## 2021-08-25 NOTE — TELEPHONE ENCOUNTER
Patient brought in MRI disc of Brain from [address] [place] [address] 6/29/21. Uploaded to [employer]. Disc returned to patient.

## 2021-08-25 NOTE — PROGRESS NOTES
Patient: Aleksandar Molina  Medical Record Number: [id number]  YOB: 1980  PCP: Keri Habermann, MD    Reason for visit: Lumbar follow up, imaging review    HISTORY OF CHIEF COMPLAINT:    Aleksandar Molina is a very pleasant 42 year o problem     spinal tumors   • Brain tumor - 2002 s/p removal 0/55/9355   • Cyclical vomiting    • Fatty liver    • ESTRELLA (nonalcoholic steatohepatitis) Bx: 3/18   • Osteopenia due to depakote 6/14/2012   • Pancreatic pseudocyst 2020    Decreased size 6/20 Take 1-2 capsules by mouth nightly. Start by taking 1 capsule at bedtime for 2 weeks and then increase to 2 capsules at bedtime if needed     • levETIRAcetam  MG Oral Tablet 24 Hr Take 1 tablet (750 mg total) by mouth nightly.  90 tablet 2   • Pimecro 5 5 5     Lower Extremity Strength:     Iliopsoas  Hamstrings   Quads    D-flexion P-flexion Great Toe   Right       5         5       5         5 5 5   Left       5         5       5         4 5- 5-   Bilateral patellar and Achilles DTRs: 2/4  Tests:   Te  Stable ex vacuo enlargement of the temporal horn of the right lateral ventricle is present.      There is redemonstration of heterogeneous enhancing mass along the inferior and medial aspect of the left tentorium overlying the superior left cerebellum gonzalo lumbar is performed with and without contrast using the following pulse sequences: Sagittal T1-weighted, sagittal T2-weighted, sagittal STIR and axial T2-weighted images.  After the intravenous contrast administration, sagittal and axial T1-weighted images thoracic spine.      MRI lumbar:     Vertebral body height is normal.  Alignment is normal.  There is fatty atrophy of the posterior aspect of the L2, L3, L4, L5 and sacral vertebral bodies, compatible with prior radiation therapy.  Remote postsurgical araiza surgery    PLAN:   1. Medication: None prescribed  2. Imaging:    - Reviewed today:    - MRI brain, spine, 6/2021:     - Agree with radiology as noted above. - Ordered today:    - MRI lumbar spine w + wo:     -Assess acute onset of right foot weakness.

## 2021-09-08 ENCOUNTER — HOSPITAL ENCOUNTER (OUTPATIENT)
Dept: MRI IMAGING | Age: 41
Discharge: HOME | End: 2021-09-08
Attending: PHYSICIAN ASSISTANT
Payer: COMMERCIAL

## 2021-09-08 DIAGNOSIS — Z98.890 HX OF LUMBOSACRAL SPINE SURGERY: ICD-10-CM

## 2021-09-08 DIAGNOSIS — M21.371 RIGHT FOOT DROP: ICD-10-CM

## 2021-09-08 DIAGNOSIS — M54.16 LUMBAR RADICULOPATHY: ICD-10-CM

## 2021-09-08 DIAGNOSIS — R29.898 WEAKNESS OF RIGHT FOOT: ICD-10-CM

## 2021-09-08 DIAGNOSIS — M62.81 MUSCLE WEAKNESS ON EXAMINATION: ICD-10-CM

## 2021-09-08 PROCEDURE — A9575 INJ GADOTERATE MEGLUMI 0.1ML: HCPCS | Performed by: PHYSICIAN ASSISTANT

## 2021-09-08 PROCEDURE — 72158 MRI LUMBAR SPINE W/O & W/DYE: CPT | Performed by: PHYSICIAN ASSISTANT

## 2021-09-14 ENCOUNTER — OFFICE VISIT (OUTPATIENT)
Dept: SURGERY | Facility: CLINIC | Age: 41
End: 2021-09-14
Payer: COMMERCIAL

## 2021-09-14 VITALS
HEART RATE: 67 BPM | OXYGEN SATURATION: 100 % | DIASTOLIC BLOOD PRESSURE: 70 MMHG | HEIGHT: 64 IN | SYSTOLIC BLOOD PRESSURE: 118 MMHG | WEIGHT: 119 LBS | BODY MASS INDEX: 20.32 KG/M2 | RESPIRATION RATE: 16 BRPM

## 2021-09-14 DIAGNOSIS — M51.36 ANNULAR TEAR OF LUMBAR DISC: Primary | ICD-10-CM

## 2021-09-14 DIAGNOSIS — M54.50 LUMBAR PAIN: ICD-10-CM

## 2021-09-14 DIAGNOSIS — M51.36 DDD (DEGENERATIVE DISC DISEASE), LUMBAR: ICD-10-CM

## 2021-09-14 DIAGNOSIS — R20.2 TINGLING SENSATION: ICD-10-CM

## 2021-09-14 PROCEDURE — 3078F DIAST BP <80 MM HG: CPT | Performed by: PHYSICIAN ASSISTANT

## 2021-09-14 PROCEDURE — 3074F SYST BP LT 130 MM HG: CPT | Performed by: PHYSICIAN ASSISTANT

## 2021-09-14 PROCEDURE — 3008F BODY MASS INDEX DOCD: CPT | Performed by: PHYSICIAN ASSISTANT

## 2021-09-14 PROCEDURE — 99213 OFFICE O/P EST LOW 20 MIN: CPT | Performed by: PHYSICIAN ASSISTANT

## 2021-09-14 NOTE — PROGRESS NOTES
Patient: Kalie Centeno  Medical Record Number: [id number]  YOB: 1980  PCP: Viji Calderon MD    Reason for visit: Lumbar follow up, imaging review    HISTORY OF CHIEF COMPLAINT:    Kalie Centeno is a very pleasant 42 year o get tingling in both arms and legs from her elbows and knees down.  She denies any pain in her arms or legs.  She states that her fingers turn purple.  She has no issues with incontinence, but she is unable to stop urinating once she starts. Stuart Richey had new i daily       Ampicillin              UNKNOWN    Comment:Had reaction as a child. Pcn [Penicillins]       UNKNOWN    Comment:Had reaction as a child.    Current Medications:  Current Outpatient Medications   Medication Sig Dispense Refill   • nortriptyline 1 atraumatic. RESPIRATORY RATE: Easy and Even  SKIN: Warm and dry  NEURO: Awake, alert and orientated. Speech fluent, comprehension intact, answering questions appropriately. SPINE:  Gait/Coordination: Intact, non-antalgic gait.     Moving bilateral uppe herniation, spinal canal or neuroforaminal stenosis. L1-L2: No disc herniation or spinal canal or neuroforaminal stenosis.    L2-L3:  Minimal disc bulge with sequelae of laminectomies is noted.  There is no spinal canal or neural foraminal stenosis.  Mini encouraged   - Gradually increase activity from there  4. Follow up in 1 year with Dr. Moiz Bowers after seeing her oncologist or call or follow up sooner or go to the ED for any new, worsening or concerning signs or symptoms     I reviewed imaging.  I discuss

## 2021-12-13 ENCOUNTER — HOSPITAL ENCOUNTER (OUTPATIENT)
Dept: MAMMOGRAPHY | Age: 41
Discharge: HOME | End: 2021-12-13
Attending: OBSTETRICS & GYNECOLOGY
Payer: COMMERCIAL

## 2021-12-13 DIAGNOSIS — Z12.31 ENCOUNTER FOR SCREENING MAMMOGRAM FOR MALIGNANT NEOPLASM OF BREAST: ICD-10-CM

## 2021-12-13 DIAGNOSIS — Z12.31 SCREENING MAMMOGRAM FOR BREAST CANCER: ICD-10-CM

## 2021-12-13 PROCEDURE — 77067 SCR MAMMO BI INCL CAD: CPT | Performed by: OBSTETRICS & GYNECOLOGY

## 2021-12-13 PROCEDURE — 77063 BREAST TOMOSYNTHESIS BI: CPT | Performed by: OBSTETRICS & GYNECOLOGY

## 2022-04-13 RX ORDER — LEVETIRACETAM 750 MG/1
TABLET, EXTENDED RELEASE ORAL
Qty: 30 TABLET | Refills: 0 | Status: SHIPPED | OUTPATIENT
Start: 2022-04-13

## 2022-05-28 DIAGNOSIS — G40.909 SEIZURE DISORDER (HCC): ICD-10-CM

## 2022-05-31 RX ORDER — LEVETIRACETAM 750 MG/1
TABLET, EXTENDED RELEASE ORAL
Qty: 30 TABLET | Refills: 0 | Status: SHIPPED | OUTPATIENT
Start: 2022-05-31

## 2022-06-20 ENCOUNTER — OFFICE VISIT (OUTPATIENT)
Dept: NEUROLOGY | Facility: CLINIC | Age: 42
End: 2022-06-20
Payer: COMMERCIAL

## 2022-06-20 VITALS
DIASTOLIC BLOOD PRESSURE: 60 MMHG | HEART RATE: 70 BPM | BODY MASS INDEX: 21 KG/M2 | RESPIRATION RATE: 14 BRPM | SYSTOLIC BLOOD PRESSURE: 118 MMHG | WEIGHT: 124 LBS

## 2022-06-20 DIAGNOSIS — G40.909 SEIZURE DISORDER (HCC): Primary | ICD-10-CM

## 2022-06-20 RX ORDER — LEVETIRACETAM 500 MG/1
500 TABLET, EXTENDED RELEASE ORAL NIGHTLY
Qty: 90 TABLET | Refills: 3 | Status: SHIPPED | OUTPATIENT
Start: 2022-06-20

## 2022-07-14 ENCOUNTER — TELEPHONE (OUTPATIENT)
Dept: NEUROLOGY | Facility: CLINIC | Age: 42
End: 2022-07-14

## 2022-07-14 ENCOUNTER — OFFICE VISIT (OUTPATIENT)
Dept: SURGERY | Facility: CLINIC | Age: 42
End: 2022-07-14
Payer: COMMERCIAL

## 2022-07-14 VITALS — OXYGEN SATURATION: 98 % | HEART RATE: 82 BPM | DIASTOLIC BLOOD PRESSURE: 64 MMHG | SYSTOLIC BLOOD PRESSURE: 98 MMHG

## 2022-07-14 DIAGNOSIS — Z98.890 HX OF LUMBOSACRAL SPINE SURGERY: Primary | ICD-10-CM

## 2022-07-14 PROCEDURE — 3078F DIAST BP <80 MM HG: CPT | Performed by: NEUROLOGICAL SURGERY

## 2022-07-14 PROCEDURE — 99213 OFFICE O/P EST LOW 20 MIN: CPT | Performed by: NEUROLOGICAL SURGERY

## 2022-07-14 PROCEDURE — 3074F SYST BP LT 130 MM HG: CPT | Performed by: NEUROLOGICAL SURGERY

## 2022-07-14 NOTE — PROGRESS NOTES
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  She is doing pretty well  She does have some tingling all over her body and her arms or legs  Is got a little more consistently when she recently saw neurology with negative EMGs  She is here for follow-up  She saw her oncologist recently has her new MRI set up for a year    PHYSICAL EXAMINATION:  Vital Signs:  BP 98/64   Pulse 82   SpO2 98%   Awake alert, orient x3  Follows commands x4      REVIEW OF STUDIES:    MRI of CT L-spine shows no recurrent disease  MRI brain looks stable      ASSESSMENT and PLAN:  44-year-old female with a history of chordoma as well as chondrosarcoma  She is doing great  I do have a good explanation of her tingling  She never had chemotherapy  I would have her follow-up with neurology and possibly repeat her EMG earlier  She does not appear to have any recurrent tumor at this time  I gave her some physical therapy to help with her posture as she like to do that every now and then does help her with her posture and overall pain  We will see her back in 1 year  All questions were answered  Patient very appreciative        Time spent on counseling/coordination of care:  20 minutes    Total time spent with patient:  20 minutes      Vitaly Bran MD   [address]  2022  11:43 AM   Dictated not proofread

## 2022-11-14 ENCOUNTER — LAB ENCOUNTER (OUTPATIENT)
Dept: LAB | Age: 42
End: 2022-11-14
Attending: FAMILY MEDICINE
Payer: COMMERCIAL

## 2022-11-14 ENCOUNTER — OFFICE VISIT (OUTPATIENT)
Dept: FAMILY MEDICINE CLINIC | Facility: CLINIC | Age: 42
End: 2022-11-14
Payer: COMMERCIAL

## 2022-11-14 VITALS
HEIGHT: 64 IN | WEIGHT: 122.81 LBS | DIASTOLIC BLOOD PRESSURE: 80 MMHG | BODY MASS INDEX: 20.97 KG/M2 | OXYGEN SATURATION: 99 % | HEART RATE: 76 BPM | SYSTOLIC BLOOD PRESSURE: 120 MMHG | RESPIRATION RATE: 20 BRPM

## 2022-11-14 DIAGNOSIS — Z00.00 WELLNESS EXAMINATION: ICD-10-CM

## 2022-11-14 DIAGNOSIS — Z13.0 SCREENING FOR DEFICIENCY ANEMIA: ICD-10-CM

## 2022-11-14 DIAGNOSIS — Z13.1 DIABETES MELLITUS SCREENING: ICD-10-CM

## 2022-11-14 DIAGNOSIS — Z13.220 LIPID SCREENING: ICD-10-CM

## 2022-11-14 DIAGNOSIS — Z00.00 WELLNESS EXAMINATION: Primary | ICD-10-CM

## 2022-11-14 PROBLEM — J18.9 PNEUMONIA OF RIGHT LOWER LOBE DUE TO INFECTIOUS ORGANISM: Status: RESOLVED | Noted: 2019-12-27 | Resolved: 2022-11-14

## 2022-11-14 LAB
ALBUMIN SERPL-MCNC: 4.5 G/DL (ref 3.4–5)
ALBUMIN/GLOB SERPL: 1.3 {RATIO} (ref 1–2)
ALP LIVER SERPL-CCNC: 36 U/L
ALT SERPL-CCNC: 27 U/L
ANION GAP SERPL CALC-SCNC: 4 MMOL/L (ref 0–18)
AST SERPL-CCNC: 22 U/L (ref 15–37)
BASOPHILS # BLD AUTO: 0.03 X10(3) UL (ref 0–0.2)
BASOPHILS NFR BLD AUTO: 0.6 %
BILIRUB SERPL-MCNC: 1.1 MG/DL (ref 0.1–2)
BUN BLD-MCNC: 16 MG/DL (ref 7–18)
CALCIUM BLD-MCNC: 9.3 MG/DL (ref 8.5–10.1)
CHLORIDE SERPL-SCNC: 107 MMOL/L (ref 98–112)
CHOLEST SERPL-MCNC: 208 MG/DL (ref ?–200)
CO2 SERPL-SCNC: 28 MMOL/L (ref 21–32)
CREAT BLD-MCNC: 0.9 MG/DL
EOSINOPHIL # BLD AUTO: 0.11 X10(3) UL (ref 0–0.7)
EOSINOPHIL NFR BLD AUTO: 2 %
ERYTHROCYTE [DISTWIDTH] IN BLOOD BY AUTOMATED COUNT: 12.3 %
EST. AVERAGE GLUCOSE BLD GHB EST-MCNC: 108 MG/DL (ref 68–126)
FASTING PATIENT LIPID ANSWER: YES
FASTING STATUS PATIENT QL REPORTED: YES
GFR SERPLBLD BASED ON 1.73 SQ M-ARVRAT: 81 ML/MIN/1.73M2 (ref 60–?)
GLOBULIN PLAS-MCNC: 3.4 G/DL (ref 2.8–4.4)
GLUCOSE BLD-MCNC: 93 MG/DL (ref 70–99)
HBA1C MFR BLD: 5.4 % (ref ?–5.7)
HCT VFR BLD AUTO: 42.8 %
HDLC SERPL-MCNC: 56 MG/DL (ref 40–59)
HGB BLD-MCNC: 13.9 G/DL
IMM GRANULOCYTES # BLD AUTO: 0.01 X10(3) UL (ref 0–1)
IMM GRANULOCYTES NFR BLD: 0.2 %
LDLC SERPL CALC-MCNC: 140 MG/DL (ref ?–100)
LYMPHOCYTES # BLD AUTO: 1.4 X10(3) UL (ref 1–4)
LYMPHOCYTES NFR BLD AUTO: 26 %
MCH RBC QN AUTO: 31.5 PG (ref 26–34)
MCHC RBC AUTO-ENTMCNC: 32.5 G/DL (ref 31–37)
MCV RBC AUTO: 97.1 FL
MONOCYTES # BLD AUTO: 0.37 X10(3) UL (ref 0.1–1)
MONOCYTES NFR BLD AUTO: 6.9 %
NEUTROPHILS # BLD AUTO: 3.47 X10 (3) UL (ref 1.5–7.7)
NEUTROPHILS # BLD AUTO: 3.47 X10(3) UL (ref 1.5–7.7)
NEUTROPHILS NFR BLD AUTO: 64.3 %
NONHDLC SERPL-MCNC: 152 MG/DL (ref ?–130)
OSMOLALITY SERPL CALC.SUM OF ELEC: 289 MOSM/KG (ref 275–295)
PLATELET # BLD AUTO: 271 10(3)UL (ref 150–450)
POTASSIUM SERPL-SCNC: 4.3 MMOL/L (ref 3.5–5.1)
PROT SERPL-MCNC: 7.9 G/DL (ref 6.4–8.2)
RBC # BLD AUTO: 4.41 X10(6)UL
SODIUM SERPL-SCNC: 139 MMOL/L (ref 136–145)
TRIGL SERPL-MCNC: 67 MG/DL (ref 30–149)
VLDLC SERPL CALC-MCNC: 12 MG/DL (ref 0–30)
WBC # BLD AUTO: 5.4 X10(3) UL (ref 4–11)

## 2022-11-14 PROCEDURE — 90471 IMMUNIZATION ADMIN: CPT | Performed by: FAMILY MEDICINE

## 2022-11-14 PROCEDURE — 80053 COMPREHEN METABOLIC PANEL: CPT | Performed by: FAMILY MEDICINE

## 2022-11-14 PROCEDURE — 3079F DIAST BP 80-89 MM HG: CPT | Performed by: FAMILY MEDICINE

## 2022-11-14 PROCEDURE — 80061 LIPID PANEL: CPT | Performed by: FAMILY MEDICINE

## 2022-11-14 PROCEDURE — 85025 COMPLETE CBC W/AUTO DIFF WBC: CPT | Performed by: FAMILY MEDICINE

## 2022-11-14 PROCEDURE — 83036 HEMOGLOBIN GLYCOSYLATED A1C: CPT | Performed by: FAMILY MEDICINE

## 2022-11-14 PROCEDURE — 99386 PREV VISIT NEW AGE 40-64: CPT | Performed by: FAMILY MEDICINE

## 2022-11-14 PROCEDURE — 90686 IIV4 VACC NO PRSV 0.5 ML IM: CPT | Performed by: FAMILY MEDICINE

## 2022-11-14 PROCEDURE — 3008F BODY MASS INDEX DOCD: CPT | Performed by: FAMILY MEDICINE

## 2022-11-14 PROCEDURE — 3074F SYST BP LT 130 MM HG: CPT | Performed by: FAMILY MEDICINE

## 2022-11-22 ENCOUNTER — TELEPHONE (OUTPATIENT)
Dept: FAMILY MEDICINE CLINIC | Facility: CLINIC | Age: 42
End: 2022-11-22

## 2022-11-22 NOTE — TELEPHONE ENCOUNTER
PT WANTS TO KNOW IF WE FAXED HER WELLNESS  FOR FROM THE LAST VISIT WITH DR WILSON. HER EMPLOYER SAID THEY DID NOT GET IT YET. PLS CALL HER BACK TO ADVISE.

## 2022-12-14 ENCOUNTER — HOSPITAL ENCOUNTER (OUTPATIENT)
Dept: MAMMOGRAPHY | Age: 42
Discharge: HOME | End: 2022-12-14
Payer: COMMERCIAL

## 2022-12-14 DIAGNOSIS — Z12.31 ENCOUNTER FOR SCREENING MAMMOGRAM FOR MALIGNANT NEOPLASM OF BREAST: ICD-10-CM

## 2022-12-14 PROCEDURE — 77063 BREAST TOMOSYNTHESIS BI: CPT | Performed by: OBSTETRICS & GYNECOLOGY

## 2022-12-14 PROCEDURE — 77067 SCR MAMMO BI INCL CAD: CPT | Performed by: OBSTETRICS & GYNECOLOGY

## 2023-06-27 DIAGNOSIS — G40.909 SEIZURE DISORDER (HCC): Primary | ICD-10-CM

## 2023-06-27 RX ORDER — LEVETIRACETAM 500 MG/1
500 TABLET, EXTENDED RELEASE ORAL NIGHTLY
Qty: 90 TABLET | Refills: 0 | Status: SHIPPED | OUTPATIENT
Start: 2023-06-27

## 2023-09-12 ENCOUNTER — OFFICE VISIT (OUTPATIENT)
Dept: FAMILY MEDICINE CLINIC | Facility: CLINIC | Age: 43
End: 2023-09-12
Payer: COMMERCIAL

## 2023-09-12 VITALS
BODY MASS INDEX: 21 KG/M2 | WEIGHT: 121.63 LBS | HEART RATE: 76 BPM | OXYGEN SATURATION: 98 % | DIASTOLIC BLOOD PRESSURE: 76 MMHG | SYSTOLIC BLOOD PRESSURE: 122 MMHG

## 2023-09-12 DIAGNOSIS — Z13.0 SCREENING FOR DEFICIENCY ANEMIA: ICD-10-CM

## 2023-09-12 DIAGNOSIS — Z13.1 SCREENING FOR DIABETES MELLITUS: ICD-10-CM

## 2023-09-12 DIAGNOSIS — Z13.29 THYROID DISORDER SCREEN: ICD-10-CM

## 2023-09-12 DIAGNOSIS — Z23 NEED FOR INFLUENZA VACCINATION: ICD-10-CM

## 2023-09-12 DIAGNOSIS — Z00.00 WELLNESS EXAMINATION: Primary | ICD-10-CM

## 2023-09-12 DIAGNOSIS — R25.2 MUSCLE CRAMPING: ICD-10-CM

## 2023-09-12 DIAGNOSIS — Z13.220 LIPID SCREENING: ICD-10-CM

## 2023-09-12 PROCEDURE — 90686 IIV4 VACC NO PRSV 0.5 ML IM: CPT | Performed by: FAMILY MEDICINE

## 2023-09-12 PROCEDURE — 90471 IMMUNIZATION ADMIN: CPT | Performed by: FAMILY MEDICINE

## 2023-09-12 PROCEDURE — 3074F SYST BP LT 130 MM HG: CPT | Performed by: FAMILY MEDICINE

## 2023-09-12 PROCEDURE — 3078F DIAST BP <80 MM HG: CPT | Performed by: FAMILY MEDICINE

## 2023-09-12 PROCEDURE — 99396 PREV VISIT EST AGE 40-64: CPT | Performed by: FAMILY MEDICINE

## 2023-09-12 RX ORDER — LORAZEPAM 1 MG/1
TABLET ORAL
COMMUNITY
Start: 2023-09-08

## 2023-09-13 ENCOUNTER — LAB ENCOUNTER (OUTPATIENT)
Dept: LAB | Age: 43
End: 2023-09-13
Attending: FAMILY MEDICINE
Payer: COMMERCIAL

## 2023-09-13 DIAGNOSIS — Z13.220 LIPID SCREENING: ICD-10-CM

## 2023-09-13 DIAGNOSIS — R25.2 MUSCLE CRAMPING: ICD-10-CM

## 2023-09-13 DIAGNOSIS — Z13.1 SCREENING FOR DIABETES MELLITUS: ICD-10-CM

## 2023-09-13 DIAGNOSIS — Z00.00 WELLNESS EXAMINATION: ICD-10-CM

## 2023-09-13 DIAGNOSIS — Z13.29 THYROID DISORDER SCREEN: ICD-10-CM

## 2023-09-13 DIAGNOSIS — Z13.0 SCREENING FOR DEFICIENCY ANEMIA: ICD-10-CM

## 2023-09-13 LAB
ALBUMIN SERPL-MCNC: 4.2 G/DL (ref 3.4–5)
ALBUMIN/GLOB SERPL: 1.2 {RATIO} (ref 1–2)
ALP LIVER SERPL-CCNC: 35 U/L
ALT SERPL-CCNC: 26 U/L
ANION GAP SERPL CALC-SCNC: 2 MMOL/L (ref 0–18)
AST SERPL-CCNC: 25 U/L (ref 15–37)
BASOPHILS # BLD AUTO: 0.02 X10(3) UL (ref 0–0.2)
BASOPHILS NFR BLD AUTO: 0.4 %
BILIRUB SERPL-MCNC: 1 MG/DL (ref 0.1–2)
BUN BLD-MCNC: 18 MG/DL (ref 7–18)
CALCIUM BLD-MCNC: 9.2 MG/DL (ref 8.5–10.1)
CHLORIDE SERPL-SCNC: 107 MMOL/L (ref 98–112)
CHOLEST SERPL-MCNC: 166 MG/DL (ref ?–200)
CO2 SERPL-SCNC: 29 MMOL/L (ref 21–32)
CREAT BLD-MCNC: 0.87 MG/DL
EGFRCR SERPLBLD CKD-EPI 2021: 84 ML/MIN/1.73M2 (ref 60–?)
EOSINOPHIL # BLD AUTO: 0.18 X10(3) UL (ref 0–0.7)
EOSINOPHIL NFR BLD AUTO: 3.4 %
ERYTHROCYTE [DISTWIDTH] IN BLOOD BY AUTOMATED COUNT: 12.2 %
EST. AVERAGE GLUCOSE BLD GHB EST-MCNC: 103 MG/DL (ref 68–126)
FASTING PATIENT LIPID ANSWER: YES
FASTING STATUS PATIENT QL REPORTED: YES
GLOBULIN PLAS-MCNC: 3.5 G/DL (ref 2.8–4.4)
GLUCOSE BLD-MCNC: 98 MG/DL (ref 70–99)
HBA1C MFR BLD: 5.2 % (ref ?–5.7)
HCT VFR BLD AUTO: 41.9 %
HDLC SERPL-MCNC: 54 MG/DL (ref 40–59)
HGB BLD-MCNC: 14.3 G/DL
IMM GRANULOCYTES # BLD AUTO: 0.01 X10(3) UL (ref 0–1)
IMM GRANULOCYTES NFR BLD: 0.2 %
LDLC SERPL CALC-MCNC: 99 MG/DL (ref ?–100)
LYMPHOCYTES # BLD AUTO: 1.41 X10(3) UL (ref 1–4)
LYMPHOCYTES NFR BLD AUTO: 26.6 %
MAGNESIUM SERPL-MCNC: 2.3 MG/DL (ref 1.6–2.6)
MCH RBC QN AUTO: 32.4 PG (ref 26–34)
MCHC RBC AUTO-ENTMCNC: 34.1 G/DL (ref 31–37)
MCV RBC AUTO: 95 FL
MONOCYTES # BLD AUTO: 0.43 X10(3) UL (ref 0.1–1)
MONOCYTES NFR BLD AUTO: 8.1 %
NEUTROPHILS # BLD AUTO: 3.25 X10 (3) UL (ref 1.5–7.7)
NEUTROPHILS # BLD AUTO: 3.25 X10(3) UL (ref 1.5–7.7)
NEUTROPHILS NFR BLD AUTO: 61.3 %
NONHDLC SERPL-MCNC: 112 MG/DL (ref ?–130)
OSMOLALITY SERPL CALC.SUM OF ELEC: 288 MOSM/KG (ref 275–295)
PLATELET # BLD AUTO: 234 10(3)UL (ref 150–450)
POTASSIUM SERPL-SCNC: 4.3 MMOL/L (ref 3.5–5.1)
PROT SERPL-MCNC: 7.7 G/DL (ref 6.4–8.2)
RBC # BLD AUTO: 4.41 X10(6)UL
SODIUM SERPL-SCNC: 138 MMOL/L (ref 136–145)
TRIGL SERPL-MCNC: 69 MG/DL (ref 30–149)
TSI SER-ACNC: 1.21 MIU/ML (ref 0.36–3.74)
VLDLC SERPL CALC-MCNC: 11 MG/DL (ref 0–30)
WBC # BLD AUTO: 5.3 X10(3) UL (ref 4–11)

## 2023-09-13 PROCEDURE — 83735 ASSAY OF MAGNESIUM: CPT | Performed by: FAMILY MEDICINE

## 2023-09-13 PROCEDURE — 80061 LIPID PANEL: CPT | Performed by: FAMILY MEDICINE

## 2023-09-13 PROCEDURE — 83036 HEMOGLOBIN GLYCOSYLATED A1C: CPT | Performed by: FAMILY MEDICINE

## 2023-09-13 PROCEDURE — 80050 GENERAL HEALTH PANEL: CPT | Performed by: FAMILY MEDICINE

## 2023-09-25 ENCOUNTER — OFFICE VISIT (OUTPATIENT)
Dept: NEUROLOGY | Facility: CLINIC | Age: 43
End: 2023-09-25
Payer: COMMERCIAL

## 2023-09-25 VITALS
HEART RATE: 64 BPM | RESPIRATION RATE: 14 BRPM | WEIGHT: 125.19 LBS | DIASTOLIC BLOOD PRESSURE: 64 MMHG | SYSTOLIC BLOOD PRESSURE: 112 MMHG | BODY MASS INDEX: 21 KG/M2

## 2023-09-25 DIAGNOSIS — C41.0 CHORDOMA OF CLIVUS (HCC): ICD-10-CM

## 2023-09-25 DIAGNOSIS — G40.909 SEIZURE DISORDER (HCC): Primary | ICD-10-CM

## 2023-09-25 DIAGNOSIS — G40.909 SEIZURE DISORDER (HCC): ICD-10-CM

## 2023-09-25 PROCEDURE — 99214 OFFICE O/P EST MOD 30 MIN: CPT | Performed by: OTHER

## 2023-09-25 PROCEDURE — 3078F DIAST BP <80 MM HG: CPT | Performed by: OTHER

## 2023-09-25 PROCEDURE — 3074F SYST BP LT 130 MM HG: CPT | Performed by: OTHER

## 2023-09-25 RX ORDER — LEVETIRACETAM 500 MG/1
500 TABLET, EXTENDED RELEASE ORAL NIGHTLY
Qty: 90 TABLET | Refills: 3 | Status: SHIPPED | OUTPATIENT
Start: 2023-09-25

## 2023-09-25 NOTE — PROGRESS NOTES
HPI:    Patient ID: Chris Cooper is a 39year old female. Patient is a 39year old pleasant female who presented for follow up for seizure disorder. She is doing well and has had no seizure activity since 2003. She remains on Keppra extended release 500 mg PM. States in July had an aura feeling which she thinks was stress related. Follow up spine MRI done at [facility] recently shows a new L3 enhancing lesion of undetermined etiology. She further report MRI brain shows stable enhancing nodule along the posterior aspect of the left tentorium. She is being closely monitor by Dr Mitchell Head Oncologist        MRI brain w and wo contrast: July 2023    1. Stable postsurgical changes status post prior right retrosigmoid and right pterional craniotomies, as detailed above. 2.  Stable heterogeneously enhancing mineralized nodule along the posterior aspect of the left tentorium when compared to prior examination. No corresponding vasogenic edema. 3.  Overall stable appearance of the heterogeneously enhancing lesion centered in the right clivus and petroclival synchondrosis, compatible with known chondrosarcoma. 4.  No new lesions are identified. 5.  Frothy secretions in the right sphenoid sinus may represent acute sinusitis in the appropriate clinical setting. She has history of clivus chordoma s/p resection and radiotherapy in 2002 diagnosed in [place] complicated by right partial III nerve palsy and seizure disorder. She also has history of thoracic chondrosarcoma s/p resection at [facility] and Lumbar chondrosarcoma s/p resection in 2015 by Dr Zackary Yap. She had MRI brain in July 2019 which showed no significant interval changes per patient. She is following with Dr Zackary Yap and plan is to do gammaknife radiotherapy    HISTORY:  Past Medical History:   Diagnosis Date    Acute pancreatitis 1/20= Etiology?     Back problem     spinal tumors    Brain tumor - 2002 s/p removal 6/14/2012 Cyclical vomiting     Fatty liver     ESTRELLA (nonalcoholic steatohepatitis) Bx: 3/18    Osteopenia due to depakote 2012    Pancreatic pseudocyst 2020    Decreased size     Sarcoma ([facility] [address] 2012    Seizure disorder ([facility] [address]     last seizure was  after brain surgery      Past Surgical History:   Procedure Laterality Date          NEEDLE BIOPSY LIVER  3/26/18= Stage 1 ESTRELLA    OTHER SURGICAL HISTORY  x's in  & x's 1     Brain tumor removal     OTHER SURGICAL HISTORY      spine tumor removal    OTHER SURGICAL HISTORY      eye surgery    OTHER SURGICAL HISTORY  2015    PARTIAL LUMBAR 2, LUMBAR 3 LAMINECTOMY, PARTIAL LUMBAR 5, PARTIAL SACRAL 1 LAMINECTOMY , RESECTION OF INTRADURAL TUMOR       Family History   Problem Relation Age of Onset    Cancer Mother 61        Cervical CA    Heart Disorder Mother     Heart Disorder Father       Social History     Socioeconomic History    Marital status:     Number of children: 1   Tobacco Use    Smoking status: Former     Packs/day: .1     Types: Cigarettes    Smokeless tobacco: Never    Tobacco comments:     quit 2016   Vaping Use    Vaping Use: Never used   Substance and Sexual Activity    Alcohol use: Not Currently    Drug use: No    Sexual activity: Yes     Partners: Male     Birth control/protection: I.U.D. Other Topics Concern    Caffeine Concern Yes     Comment: coffee one cup and tea occ     Exercise Yes     Comment: daily            Review of Systems   Constitutional: Negative. HENT: Negative. Eyes: Negative. Respiratory: Negative. Cardiovascular: Negative. Gastrointestinal: Negative. Musculoskeletal: Negative. Skin: Negative. Neurological:  Negative for dizziness, seizures, weakness, numbness and headaches. Hematological: Negative. Psychiatric/Behavioral: Negative. All other systems reviewed and are negative.            Current Outpatient Medications   Medication Sig Dispense Refill LORazepam 1 MG Oral Tab Take 1 tablet by mouth if needed for anxiety Before MRI      levETIRAcetam  MG Oral Tablet 24 Hr Take 1 tablet (500 mg total) by mouth nightly. 90 tablet 0    vitamin E 400 UNITS Oral Cap Take 1 capsule (400 Units total) by mouth daily. 30 capsule 3    Milk Thistle 250 MG Oral Cap Take by mouth 3 (three) times daily. Calcium Carbonate-Vit D-Min (CALCIUM 1200 OR) Take 1 tablet by mouth daily. Multiple Vitamins-Minerals (MULTIVITAMIN OR) Take 1 tablet by mouth daily. Magnesium 500 MG Oral Tab Take 1 tablet (500 mg total) by mouth daily. Glucosamine HCl-Glucosamin SO4 (GLUCOSAMINE COMPLEX OR) Take 1 tablet by mouth daily. Allergies:  Ampicillin              UNKNOWN    Comment:Had reaction as a child. Pcn [Penicillins]       UNKNOWN    Comment:Had reaction as a child. PHYSICAL EXAM:   Physical Exam  Blood pressure 112/64, pulse 64, resp. rate 14, weight 125 lb 3.2 oz (56.8 kg), not currently breastfeeding. General: A 39year old female, well developed, well nourished  HEENT: Normocephalic and atraumatic. Normal sclera  Cardiovascular: Normal rate, regular rhythm and normal heart sounds. Pulmonary/Chest: Effort normal and breath sounds normal.   Abdominal: Soft. Bowel sounds are normal.   Skin: Skin is warm and dry. Psychiatric:normal mood and affect. NEUROLOGICAL: This patient is alert and orientated x 3. Speech is fluent with intact comprehension. Pupils equally round and reactive to light. 3+ brisk bilaterally. EOMs intact. Visual fields are full. Face is symmetrical. Tongue is midline. Uvula and palate elevate symmetrically. Shrug shoulders normally bilaterally. The rest of the cranial nerves are grossly intact. Sensation to light touch is intact bilaterally. Motor: Normal tone. No arm or leg weakness noted, strength approximately 5/5 bilaterally. Finger-to-nose coordination is intact. Gait brisk and steady.             ASSESSMENT/PLAN: (G40.909) Seizure disorder ([facility] [address]  (primary encounter diagnosis)  Plan: levETIRAcetam  MG Oral Tablet 24 Hr             Seizure disorder in remission and had no episodes in last 20 years  Patient elected to continue AED. Will reduce dose further to 500 mg- take Keppra  mg at bedtime    Intermittent tingling and numbness unclear etiology  EMG and NCS negative for polyneuropathy or radiculopathy. Mild right median neuropathy noted   May repeat EMG and NCS and other testing  if symptoms persist or worsens      History of spine chondrosarcoma. Follows with Oncology at [place]     Follow up in 6-12 months or sooner if necessary  See orders and medications filed with this encounter. The patient indicates understanding of these issues and agrees with the plan. No orders of the defined types were placed in this encounter.       Meds This Visit:  Requested Prescriptions      No prescriptions requested or ordered in this encounter       Imaging & Referrals:  None       ID#[id number]    HPI

## 2023-09-26 RX ORDER — LEVETIRACETAM 500 MG/1
500 TABLET, EXTENDED RELEASE ORAL NIGHTLY
Qty: 90 TABLET | Refills: 0 | OUTPATIENT
Start: 2023-09-26

## 2023-09-26 NOTE — TELEPHONE ENCOUNTER
Spoke with the pharmacy who states to disregard the refill request. Patient has refills on file. Medication:  LEVETIRACETAM  MG Oral Tablet 24 Hr      Date of last refill: 09/25/2023 (#90/3)  [place]     Last office visit: 09/25/2023  Due back to clinic per last office note:  Around 09/25/2024  Date next office visit scheduled:    Future Appointments   Date Time Provider Gera Hinojosa   12/15/2023  1:40 PM BK TAPAN [address] note recommendation:    Assessment  ASSESSMENT/PLAN:   (G40.909) Seizure disorder ([facility] [address]  (primary encounter diagnosis)  Plan: levETIRAcetam  MG Oral Tablet 24 Hr               Seizure disorder in remission and had no episodes in last 20 years  Patient elected to continue AED. Will reduce dose further to 500 mg- take Keppra  mg at bedtime     Intermittent tingling and numbness unclear etiology  EMG and NCS negative for polyneuropathy or radiculopathy. Mild right median neuropathy noted   May repeat EMG and NCS and other testing  if symptoms persist or worsens        History of spine chondrosarcoma. Follows with Oncology at [facility]      Follow up in 6-12 months or sooner if necessary  See orders and medications filed with this encounter. The patient indicates understanding of these issues and agrees with the plan.            No orders of the defined types were placed in this encounter

## 2023-10-05 ENCOUNTER — TELEPHONE (OUTPATIENT)
Dept: NEUROLOGY | Facility: CLINIC | Age: 43
End: 2023-10-05

## 2023-10-05 DIAGNOSIS — G40.909 SEIZURE DISORDER (HCC): ICD-10-CM

## 2023-10-05 RX ORDER — LEVETIRACETAM 750 MG/1
750 TABLET, EXTENDED RELEASE ORAL DAILY
Qty: 30 TABLET | Refills: 5 | Status: SHIPPED | OUTPATIENT
Start: 2023-10-05

## 2023-10-05 RX ORDER — LEVETIRACETAM 250 MG/1
250 TABLET ORAL
Qty: 90 TABLET | Status: CANCELLED | OUTPATIENT
Start: 2023-10-05 | End: 2023-10-06

## 2023-10-05 NOTE — TELEPHONE ENCOUNTER
S: Patient requesting to increase Keppra to 750 MG nightly. B: LOV: 9/25/23  ASSESSMENT/PLAN:   (G40.909) Seizure disorder (HCC)  (primary encounter diagnosis)  Plan: levETIRAcetam  MG Oral Tablet 24 Hr               Seizure disorder in remission and had no episodes in last 20 years  Patient elected to continue AED. Will reduce dose further to 500 mg- take Keppra  mg at bedtime     Intermittent tingling and numbness unclear etiology  EMG and NCS negative for polyneuropathy or radiculopathy. Mild right median neuropathy noted   May repeat EMG and NCS and other testing  if symptoms persist or worsens        History of spine chondrosarcoma. Follows with Oncology at [facility]      Follow up in 6-12 months or sooner if necessary    A: Spoke with patient who stated last evening she had what paramedics considered \"a seizure. \" She reported after dinner, she felt weak & lightheaded. Attempted to stand up and fainted. Lasted about 10 seconds, then was responsive but confused for about 20 minutes. Called 911 (around 8 pm). B/P: 90/60 initially, then 105/70. Did not want to go to the ER, so paramedics called & spoke to hospitalist who suggested pt have dosage of Keppra increased to 750 MG nightly. Today, patient reports feeling \"ok, just a little weak. \"    R: Routed to provider for review and recommendation.

## 2023-10-05 NOTE — TELEPHONE ENCOUNTER
Called patient to update.  Explained Keppra 500 MG ER was discontinued & new RX for Keppra 750 MG ER was sent to [place].

## 2023-10-05 NOTE — TELEPHONE ENCOUNTER
Pt is calling in regards to current medication levETIRAcetam  MG Oral Tablet 24 Hr; Pt states that she recently had a paramedic come to her home, and she states that they gave her advice to go back to 750 mg of the levETIRAcetam  MG Oral Tablet 24 Hr.  Pt would like a call back; Please advise

## 2023-12-15 ENCOUNTER — HOSPITAL ENCOUNTER (OUTPATIENT)
Dept: MAMMOGRAPHY | Age: 43
Discharge: HOME | End: 2023-12-15
Attending: OBSTETRICS & GYNECOLOGY
Payer: COMMERCIAL

## 2023-12-15 DIAGNOSIS — Z12.31 VISIT FOR SCREENING MAMMOGRAM: ICD-10-CM

## 2023-12-15 PROCEDURE — 77063 BREAST TOMOSYNTHESIS BI: CPT | Performed by: OBSTETRICS & GYNECOLOGY

## 2023-12-15 PROCEDURE — 77067 SCR MAMMO BI INCL CAD: CPT | Performed by: OBSTETRICS & GYNECOLOGY

## 2024-02-13 ENCOUNTER — TELEPHONE (OUTPATIENT)
Dept: ORTHOPEDICS CLINIC | Facility: CLINIC | Age: 44
End: 2024-02-13

## 2024-02-13 DIAGNOSIS — M25.521 RIGHT ELBOW PAIN: Primary | ICD-10-CM

## 2024-02-13 NOTE — TELEPHONE ENCOUNTER
Future Appointments   Date Time Provider Department Center   2/21/2024 10:30 AM Mick Hyatt,  [facility]       This patient is coming for RT Elbow injury. No prior imaging done yet. Please advise if views are needed for this appt. Thanks.    Patient may be reached at [phone]

## 2024-02-13 NOTE — TELEPHONE ENCOUNTER
XR ordered per ortho protocol. XR scheduled and patient was notified via [facility] to let them know that they should arrive 15-20 minutes early, in order for them to complete imaging.

## 2024-02-21 ENCOUNTER — OFFICE VISIT (OUTPATIENT)
Dept: ORTHOPEDICS CLINIC | Facility: CLINIC | Age: 44
End: 2024-02-21
Payer: COMMERCIAL

## 2024-02-21 ENCOUNTER — HOSPITAL ENCOUNTER (OUTPATIENT)
Dept: GENERAL RADIOLOGY | Age: 44
Discharge: HOME | End: 2024-02-21
Attending: FAMILY MEDICINE
Payer: COMMERCIAL

## 2024-02-21 VITALS — WEIGHT: 126 LBS | BODY MASS INDEX: 21.51 KG/M2 | HEIGHT: 64 IN

## 2024-02-21 DIAGNOSIS — M25.521 RIGHT ELBOW PAIN: ICD-10-CM

## 2024-02-21 DIAGNOSIS — M77.11 RIGHT LATERAL EPICONDYLITIS: Primary | ICD-10-CM

## 2024-02-21 PROCEDURE — 99204 OFFICE O/P NEW MOD 45 MIN: CPT | Performed by: FAMILY MEDICINE

## 2024-02-21 PROCEDURE — 3008F BODY MASS INDEX DOCD: CPT | Performed by: FAMILY MEDICINE

## 2024-02-21 PROCEDURE — 73080 X-RAY EXAM OF ELBOW: CPT | Performed by: FAMILY MEDICINE

## 2024-02-21 RX ORDER — DICLOFENAC SODIUM 75 MG/1
75 TABLET, DELAYED RELEASE ORAL 2 TIMES DAILY
Qty: 28 TABLET | Refills: 1 | Status: SHIPPED | OUTPATIENT
Start: 2024-02-21

## 2024-02-21 NOTE — H&P
Sports Medicine Clinic Note     Subjective:    Chief Complaint   Patient presents with    Elbow Injury     Right elbow, xray in chart-started about 2-3 weeks ago hit her elbow on the door frame. Pain is shooting through her tendons and moving fingers triggers pain again in the elbow. She is right hand dominant. Not able to sleep well. She has taken some Tylenol and advil prn pain. But doesn't seem to help much       HPI: This is a pleasant 43 year old RHD female who presents with right lateral elbow pain for the past several weeks, which they describe as a sharp, intermittent pain, exacerbated during repetitive wrist extension activities, particularly when gripping objects or extending the wrist. The pain seems to be isolated to the lateral aspect of the elbow and does not radiate. The patient denies any history of direct trauma to the elbow. Over-the-counter pain relievers provide some temporary relief, but the discomfort persists. Notes an incident a few weeks ago where she bumped her elbow against a door frame, unsure if this is related.    Objective:    Ht 5' 4\" (1.626 m)   Wt 126 lb (57.2 kg)   BMI 21.63 kg/m²     Examination of the right elbow:    Inspection:   No visible swelling, erythema, or deformities observed over the lateral elbow. No scars or skin changes.    Palpation:   Tenderness to palpation over the lateral epicondyle. No warmth or crepitus noted.    Active/Passive ROM:   Full active and passive range of motion of the elbow without crepitus. Pain on resisted wrist extension.    Neurovascular:   Normal capillary refill, strong radial pulse. Sensation intact to light touch over the forearm and hand. No signs of median or radial nerve compression.    Special Orthopedic Tests:   Positive Cozen's test and Mill's test.    Imaging:    Radiographs of the affected elbow personally reviewed in the office. No evidence of fractures, bony abnormalities, or osteoarthritic changes. The soft tissues appear  normal.    Assessment & Plan:    43 year old female with clinical history and examination consistent with    Lateral epicondylitis, right elbow.    The patient will be treated with the following:    - Will order for Physical Therapy focused on strengthening and stretching exercises for the extensor muscles.  - Recommend OTC pain relievers such as Tylenol for pain. A prescription for Diclofenac was provided today for breakthrough pain as well.  - Discussed utilization of a wrist splint or counterforce brace to reduce strain on the involved tendons.   - Advise the patient to refrain from aggravating activities, especially repetitive wrist motions.  - For persistent pain, an in-office steroid or PRP injection may be considered to alleviate symptoms.    Tentative follow-up in 4-6 weeks to assess progress and adjust the management plan accordingly.    Mick Hyatt DO, ELYSSAM   Primary Care Sports Medicine    Department of Orthopaedic Surgery  [address] [address]   [address] [place] [id number]    t: [phone]  f: [phone]      [facility]

## 2024-02-27 ENCOUNTER — HOSPITAL ENCOUNTER (OUTPATIENT)
Dept: MAMMOGRAPHY | Facility: HOSPITAL | Age: 44
Discharge: HOME | End: 2024-02-27
Attending: OBSTETRICS & GYNECOLOGY
Payer: COMMERCIAL

## 2024-02-27 DIAGNOSIS — Z12.39 ENCOUNTER FOR BREAST CANCER SCREENING OTHER THAN MAMMOGRAM: ICD-10-CM

## 2024-02-27 DIAGNOSIS — R92.30 INCONCLUSIVE MAMMOGRAPHY DUE TO DENSE BREASTS: ICD-10-CM

## 2024-02-27 DIAGNOSIS — R92.2 INCONCLUSIVE MAMMOGRAPHY DUE TO DENSE BREASTS: ICD-10-CM

## 2024-02-27 PROCEDURE — 76641 ULTRASOUND BREAST COMPLETE: CPT | Performed by: OBSTETRICS & GYNECOLOGY

## 2024-04-01 DIAGNOSIS — G40.909 SEIZURE DISORDER (HCC): Primary | ICD-10-CM

## 2024-04-02 RX ORDER — LEVETIRACETAM 750 MG/1
750 TABLET, FILM COATED, EXTENDED RELEASE ORAL DAILY
Qty: 30 TABLET | Refills: 2 | Status: SHIPPED | OUTPATIENT
Start: 2024-04-02

## 2024-04-02 NOTE — TELEPHONE ENCOUNTER
Medication: LEVETIRACETAM  MG Oral Tablet 24 Hr      Date of last refill: 10/05/2023 (#30/5)  [place]     Last office visit: 09/25/2023  Due back to clinic per last office note:  Around 09/25/2024  Date next office visit scheduled:    No future appointments.        Last OV note recommendation:    Assessment  ASSESSMENT/PLAN:   (G40.909) Seizure disorder (HCC)  (primary encounter diagnosis)  Plan: levETIRAcetam  MG Oral Tablet 24 Hr               Seizure disorder in remission and had no episodes in last 20 years  Patient elected to continue AED. Will reduce dose further to 500 mg- take Keppra  mg at bedtime     Intermittent tingling and numbness unclear etiology  EMG and NCS negative for polyneuropathy or radiculopathy. Mild right median neuropathy noted   May repeat EMG and NCS and other testing  if symptoms persist or worsens        History of spine chondrosarcoma. Follows with Oncology at [facility]      Follow up in 6-12 months or sooner if necessary  See orders and medications filed with this encounter. The patient indicates understanding of these issues and agrees with the plan.           No orders of the defined types were placed in this encounter.

## 2024-06-28 DIAGNOSIS — G40.909 SEIZURE DISORDER (HCC): ICD-10-CM

## 2024-06-28 RX ORDER — LEVETIRACETAM 750 MG/1
750 TABLET, FILM COATED, EXTENDED RELEASE ORAL DAILY
Qty: 30 TABLET | Refills: 2 | Status: SHIPPED | OUTPATIENT
Start: 2024-06-28

## 2024-06-28 NOTE — TELEPHONE ENCOUNTER
Medication: LEVETIRACETAM  MG Oral Tablet 24 Hr      Date of last refill: 04/02/2024 (#30/2)  [place]     Last office visit: 09/25/2023  Due back to clinic per last office note:  Around 09/25/2024  Date next office visit scheduled:    No future appointments.        Last OV note recommendation:    Assessment  ASSESSMENT/PLAN:   (G40.909) Seizure disorder (HCC)  (primary encounter diagnosis)  Plan: levETIRAcetam  MG Oral Tablet 24 Hr               Seizure disorder in remission and had no episodes in last 20 years  Patient elected to continue AED. Will reduce dose further to 500 mg- take Keppra  mg at bedtime     Intermittent tingling and numbness unclear etiology  EMG and NCS negative for polyneuropathy or radiculopathy. Mild right median neuropathy noted   May repeat EMG and NCS and other testing  if symptoms persist or worsens        History of spine chondrosarcoma. Follows with Oncology at [facility]      Follow up in 6-12 months or sooner if necessary  See orders and medications filed with this encounter. The patient indicates understanding of these issues and agrees with the plan.           No orders of the defined types were placed in this encounter.

## 2024-09-24 ENCOUNTER — OFFICE VISIT (OUTPATIENT)
Dept: FAMILY MEDICINE CLINIC | Facility: CLINIC | Age: 44
End: 2024-09-24
Payer: COMMERCIAL

## 2024-09-24 VITALS
RESPIRATION RATE: 16 BRPM | DIASTOLIC BLOOD PRESSURE: 68 MMHG | BODY MASS INDEX: 20.97 KG/M2 | WEIGHT: 122.81 LBS | HEIGHT: 64 IN | HEART RATE: 74 BPM | OXYGEN SATURATION: 97 % | SYSTOLIC BLOOD PRESSURE: 102 MMHG

## 2024-09-24 DIAGNOSIS — Z13.29 THYROID DISORDER SCREEN: ICD-10-CM

## 2024-09-24 DIAGNOSIS — Z00.00 WELLNESS EXAMINATION: Primary | ICD-10-CM

## 2024-09-24 DIAGNOSIS — Z13.0 SCREENING FOR DEFICIENCY ANEMIA: ICD-10-CM

## 2024-09-24 DIAGNOSIS — R73.09 ELEVATED GLUCOSE: ICD-10-CM

## 2024-09-24 DIAGNOSIS — R20.2 NUMBNESS AND TINGLING: ICD-10-CM

## 2024-09-24 DIAGNOSIS — R20.0 NUMBNESS AND TINGLING: ICD-10-CM

## 2024-09-24 DIAGNOSIS — Z13.220 LIPID SCREENING: ICD-10-CM

## 2024-09-24 PROCEDURE — 3074F SYST BP LT 130 MM HG: CPT | Performed by: FAMILY MEDICINE

## 2024-09-24 PROCEDURE — 3078F DIAST BP <80 MM HG: CPT | Performed by: FAMILY MEDICINE

## 2024-09-24 PROCEDURE — 3008F BODY MASS INDEX DOCD: CPT | Performed by: FAMILY MEDICINE

## 2024-09-24 PROCEDURE — 99396 PREV VISIT EST AGE 40-64: CPT | Performed by: FAMILY MEDICINE

## 2024-09-24 RX ORDER — LEVONORGESTREL 52 MG/1
INTRAUTERINE DEVICE INTRAUTERINE
COMMUNITY

## 2024-09-24 NOTE — PROGRESS NOTES
HPI:   Janee Alonzo is a 44 year old female who presents for an Annual Health Visit.   Janee is here for wellness testing.    Had a colonoscopy in  or . Due to irrergular bowel movements.  Was normal.    No major changes in life or health.    Has had follow up imaging on Lumbar spine, at L3, is to challenging to biopsy.    Doesn't look like definitive answer. Has oncology follow up on .    Does have tingling in left leg, will get needles        Allergies:     Allergies   Allergen Reactions    Ampicillin UNKNOWN     Had reaction as a child.    Pcn [Penicillins] UNKNOWN     Had reaction as a child.       CURRENT MEDICATIONS   Current Outpatient Medications   Medication Sig Dispense Refill    Calcium Citrate 150 MG Oral Cap Orally      Levonorgestrel (MIRENA, 52 MG,) 20 MCG/DAY Intrauterine IUD Mirena      LEVETIRACETAM  MG Oral Tablet 24 Hr TAKE ONE TABLET BY MOUTH ONCE DAILY 30 tablet 2    LORazepam 1 MG Oral Tab Take 1 tablet by mouth if needed for anxiety Before MRI      vitamin E 400 UNITS Oral Cap Take 1 capsule (400 Units total) by mouth daily. 30 capsule 3    Calcium Carbonate-Vit D-Min (CALCIUM 1200 OR) Take 1 tablet by mouth daily.      Multiple Vitamins-Minerals (MULTIVITAMIN OR) Take 1 tablet by mouth daily.      Magnesium 500 MG Oral Tab Take 1 tablet (500 mg total) by mouth daily.      Glucosamine HCl-Glucosamin SO4 (GLUCOSAMINE COMPLEX OR) Take 1 tablet by mouth daily.        HISTORICAL INFORMATION   Past Medical History:    Acute pancreatitis (HCC)    Anxiety    Back problem    spinal tumors    Brain tumor -  s/p removal    Cyclical vomiting    Fatty liver    ESTRELLA (nonalcoholic steatohepatitis)    Osteopenia due to depakote    Pancreatic pseudocyst (HCC)    Decreased size     Sarcoma (HCC)    Seizure disorder (HCC)    last seizure was  after brain surgery      Past Surgical History:   Procedure Laterality Date          Needle biopsy liver   3/26/18= Stage 1 ESTRELLA    Other surgical history  x's in 2002 & x's 1 2003    Brain tumor removal     Other surgical history  2008    spine tumor removal    Other surgical history  2005    eye surgery    Other surgical history  4/24/2015    PARTIAL LUMBAR 2, LUMBAR 3 LAMINECTOMY, PARTIAL LUMBAR 5, PARTIAL SACRAL 1 LAMINECTOMY , RESECTION OF INTRADURAL TUMOR       Family History   Problem Relation Age of Onset    Cancer Mother 60        Cervical CA    Heart Disorder Mother     Heart Disorder Father       SOCIAL HISTORY   Social History     Socioeconomic History    Marital status:     Number of children: 1   Tobacco Use    Smoking status: Former     Current packs/day: 0.10     Types: Cigarettes    Smokeless tobacco: Never    Tobacco comments:     quit 7/2016   Vaping Use    Vaping status: Never Used   Substance and Sexual Activity    Alcohol use: Not Currently    Drug use: No    Sexual activity: Yes     Partners: Male     Birth control/protection: I.U.D.   Other Topics Concern    Caffeine Concern Yes     Comment: coffee one cup and tea occ     Exercise Yes     Comment: daily     Social History     Social History Narrative    Not on file        REVIEW OF SYSTEMS:     Review of Systems  Negative other than as noted in HPI.  Possible hearing loss, but not certain, passed audiology test.      EXAM:   /68 (BP Location: Left arm, Patient Position: Sitting, Cuff Size: adult)   Pulse 74   Resp 16   Ht 5' 4\" (1.626 m)   Wt 122 lb 12.8 oz (55.7 kg)   SpO2 97%   BMI 21.08 kg/m²    Wt Readings from Last 6 Encounters:   09/24/24 122 lb 12.8 oz (55.7 kg)   02/21/24 126 lb (57.2 kg)   09/25/23 125 lb 3.2 oz (56.8 kg)   09/12/23 121 lb 9.6 oz (55.2 kg)   11/14/22 122 lb 12.8 oz (55.7 kg)   06/20/22 124 lb (56.2 kg)     Body mass index is 21.08 kg/m².    Physical Exam     ASSESSMENT AND PLAN:   Janee was seen today for physical.    Diagnoses and all orders for this visit:    Wellness examination  -     Comp  Metabolic Panel (14); Future  -     CBC With Differential With Platelet; Future  -     Lipid Panel; Future  -     Hemoglobin A1C; Future  -     TSH W Reflex To Free T4; Future    Elevated glucose  -     Hemoglobin A1C; Future    Lipid screening  -     Lipid Panel; Future    Screening for deficiency anemia  -     CBC With Differential With Platelet; Future    Thyroid disorder screen  -     TSH W Reflex To Free T4; Future    Overall stable, has close follow up on a lesion on L3, following with oncology    Rupesh get screening  labs.    Discussed colon cancer screening she will think about cologuard vs colonoscopy  There are no Patient Instructions on file for this visit.    The patient indicates understanding of these issues and agrees to the plan.    Problem List:  Patient Active Problem List   Diagnosis    Brain tumor - 2002 s/p removal    Osteopenia due to depakote    Lumbago    Chondrosarcoma of bone (HCC)    Seizures (HCC)    Partial right third nerve palsy    Anxiety    Intractable back pain    Secondary malignancy of cauda equina (HCC)    3rd nerve palsy, complete    Kyphosis of thoracic region    LFT elevation    Contusion of lesser toe of left foot without damage to nail, initial encounter    Paralytic lagophthalmos    Paralytic strabismus    History of blood transfusion    Chronic pain    Neoplastic disease    Visual impairment    Diplopia    Ocular hypertension, bilateral    Vertical heterophoria       Arturo Calderon MD  9/24/2024  10:07 AM

## 2024-09-26 ENCOUNTER — LAB ENCOUNTER (OUTPATIENT)
Dept: LAB | Age: 44
End: 2024-09-26
Attending: FAMILY MEDICINE
Payer: COMMERCIAL

## 2024-09-26 DIAGNOSIS — Z00.00 WELLNESS EXAMINATION: ICD-10-CM

## 2024-09-26 DIAGNOSIS — Z13.220 LIPID SCREENING: ICD-10-CM

## 2024-09-26 DIAGNOSIS — R73.09 ELEVATED GLUCOSE: ICD-10-CM

## 2024-09-26 DIAGNOSIS — Z13.0 SCREENING FOR DEFICIENCY ANEMIA: ICD-10-CM

## 2024-09-26 DIAGNOSIS — R20.2 NUMBNESS AND TINGLING: ICD-10-CM

## 2024-09-26 DIAGNOSIS — R20.0 NUMBNESS AND TINGLING: ICD-10-CM

## 2024-09-26 DIAGNOSIS — Z13.29 THYROID DISORDER SCREEN: ICD-10-CM

## 2024-09-26 LAB
ALBUMIN SERPL-MCNC: 5 G/DL (ref 3.2–4.8)
ALBUMIN/GLOB SERPL: 1.6 {RATIO} (ref 1–2)
ALP LIVER SERPL-CCNC: 35 U/L
ALT SERPL-CCNC: 19 U/L
ANION GAP SERPL CALC-SCNC: 9 MMOL/L (ref 0–18)
AST SERPL-CCNC: 25 U/L (ref ?–34)
BASOPHILS # BLD AUTO: 0.04 X10(3) UL (ref 0–0.2)
BASOPHILS NFR BLD AUTO: 0.7 %
BILIRUB SERPL-MCNC: 1 MG/DL (ref 0.3–1.2)
BUN BLD-MCNC: 18 MG/DL (ref 9–23)
CALCIUM BLD-MCNC: 9.9 MG/DL (ref 8.7–10.4)
CHLORIDE SERPL-SCNC: 106 MMOL/L (ref 98–112)
CHOLEST SERPL-MCNC: 179 MG/DL (ref ?–200)
CO2 SERPL-SCNC: 24 MMOL/L (ref 21–32)
CREAT BLD-MCNC: 1.04 MG/DL
EGFRCR SERPLBLD CKD-EPI 2021: 68 ML/MIN/1.73M2 (ref 60–?)
EOSINOPHIL # BLD AUTO: 0.28 X10(3) UL (ref 0–0.7)
EOSINOPHIL NFR BLD AUTO: 4.8 %
ERYTHROCYTE [DISTWIDTH] IN BLOOD BY AUTOMATED COUNT: 12.4 %
EST. AVERAGE GLUCOSE BLD GHB EST-MCNC: 100 MG/DL (ref 68–126)
FASTING PATIENT LIPID ANSWER: YES
FASTING STATUS PATIENT QL REPORTED: YES
GLOBULIN PLAS-MCNC: 3.2 G/DL (ref 2–3.5)
GLUCOSE BLD-MCNC: 100 MG/DL (ref 70–99)
HBA1C MFR BLD: 5.1 % (ref ?–5.7)
HCT VFR BLD AUTO: 41.2 %
HDLC SERPL-MCNC: 55 MG/DL (ref 40–59)
HGB BLD-MCNC: 14.2 G/DL
IMM GRANULOCYTES # BLD AUTO: 0.01 X10(3) UL (ref 0–1)
IMM GRANULOCYTES NFR BLD: 0.2 %
LDLC SERPL CALC-MCNC: 112 MG/DL (ref ?–100)
LYMPHOCYTES # BLD AUTO: 1.64 X10(3) UL (ref 1–4)
LYMPHOCYTES NFR BLD AUTO: 27.9 %
MCH RBC QN AUTO: 32.9 PG (ref 26–34)
MCHC RBC AUTO-ENTMCNC: 34.5 G/DL (ref 31–37)
MCV RBC AUTO: 95.4 FL
MONOCYTES # BLD AUTO: 0.42 X10(3) UL (ref 0.1–1)
MONOCYTES NFR BLD AUTO: 7.1 %
NEUTROPHILS # BLD AUTO: 3.49 X10 (3) UL (ref 1.5–7.7)
NEUTROPHILS # BLD AUTO: 3.49 X10(3) UL (ref 1.5–7.7)
NEUTROPHILS NFR BLD AUTO: 59.3 %
NONHDLC SERPL-MCNC: 124 MG/DL (ref ?–130)
OSMOLALITY SERPL CALC.SUM OF ELEC: 290 MOSM/KG (ref 275–295)
PLATELET # BLD AUTO: 242 10(3)UL (ref 150–450)
POTASSIUM SERPL-SCNC: 4.9 MMOL/L (ref 3.5–5.1)
PROT SERPL-MCNC: 8.2 G/DL (ref 5.7–8.2)
RBC # BLD AUTO: 4.32 X10(6)UL
SODIUM SERPL-SCNC: 139 MMOL/L (ref 136–145)
TRIGL SERPL-MCNC: 65 MG/DL (ref 30–149)
TSI SER-ACNC: 1.15 MIU/ML (ref 0.55–4.78)
VIT B12 SERPL-MCNC: 1332 PG/ML (ref 211–911)
VLDLC SERPL CALC-MCNC: 11 MG/DL (ref 0–30)
WBC # BLD AUTO: 5.9 X10(3) UL (ref 4–11)

## 2024-09-26 PROCEDURE — 80061 LIPID PANEL: CPT | Performed by: FAMILY MEDICINE

## 2024-09-26 PROCEDURE — 80050 GENERAL HEALTH PANEL: CPT | Performed by: FAMILY MEDICINE

## 2024-09-26 PROCEDURE — 83036 HEMOGLOBIN GLYCOSYLATED A1C: CPT | Performed by: FAMILY MEDICINE

## 2024-09-26 PROCEDURE — 82607 VITAMIN B-12: CPT | Performed by: FAMILY MEDICINE

## 2024-10-17 DIAGNOSIS — G40.909 SEIZURE DISORDER (HCC): ICD-10-CM

## 2024-10-17 RX ORDER — LEVETIRACETAM 750 MG/1
750 TABLET, FILM COATED, EXTENDED RELEASE ORAL DAILY
Qty: 30 TABLET | Refills: 0 | Status: SHIPPED | OUTPATIENT
Start: 2024-10-17

## 2024-10-17 NOTE — TELEPHONE ENCOUNTER
Medication: LEVETIRACETAM  MG Oral Tablet 24 Hr      Date of last refill: 6/28/24 (#30/2)  [place]     Last office visit: 9/25/23  Due back to clinic per last office note:  1 year (around 9/25/2024).   Date next office visit scheduled:    Future Appointments   Date Time Provider Department Center   12/16/2024 10:40 AM [facility]           Last OV note recommendation:    (G40.909) Seizure disorder (HCC)  (primary encounter diagnosis)  Plan: levETIRAcetam  MG Oral Tablet 24 Hr               Seizure disorder in remission and had no episodes in last 20 years  Patient elected to continue AED. Will reduce dose further to 500 mg- take Keppra  mg at bedtime     Intermittent tingling and numbness unclear etiology  EMG and NCS negative for polyneuropathy or radiculopathy. Mild right median neuropathy noted   May repeat EMG and NCS and other testing  if symptoms persist or worsens        History of spine chondrosarcoma. Follows with Oncology at [facility]      Follow up in 6-12 months or sooner if necessary

## 2024-11-14 DIAGNOSIS — G40.909 SEIZURE DISORDER (HCC): ICD-10-CM

## 2024-11-14 RX ORDER — LEVETIRACETAM 750 MG/1
750 TABLET, FILM COATED, EXTENDED RELEASE ORAL DAILY
Qty: 30 TABLET | Refills: 0 | Status: SHIPPED | OUTPATIENT
Start: 2024-11-14

## 2024-11-14 NOTE — TELEPHONE ENCOUNTER
Medication:  LEVETIRACETAM  MG Oral Tablet 24 Hr      Date of last refill: 10/17/2024 (#30/0)  [place]     Last office visit: 09/25/2023  Due back to clinic per last office note:  1 Year   Date next office visit scheduled:    Future Appointments   Date Time Provider Department Center   12/16/2024 10:40 AM [facility] [address]           Last OV note recommendation:    Assessment  ASSESSMENT/PLAN:   (G40.909) Seizure disorder (HCC)  (primary encounter diagnosis)  Plan: levETIRAcetam  MG Oral Tablet 24 Hr               Seizure disorder in remission and had no episodes in last 20 years  Patient elected to continue AED. Will reduce dose further to 500 mg- take Keppra  mg at bedtime     Intermittent tingling and numbness unclear etiology  EMG and NCS negative for polyneuropathy or radiculopathy. Mild right median neuropathy noted   May repeat EMG and NCS and other testing  if symptoms persist or worsens        History of spine chondrosarcoma. Follows with Oncology at [facility]      Follow up in 6-12 months or sooner if necessary  See orders and medications filed with this encounter. The patient indicates understanding of these issues and agrees with the plan.           No orders of the defined types were placed in this encounter.        Meds This Visit:  Requested Prescriptions        No prescriptions requested or ordered in this encounter

## 2024-12-12 DIAGNOSIS — G40.909 SEIZURE DISORDER (HCC): ICD-10-CM

## 2024-12-12 NOTE — TELEPHONE ENCOUNTER
Reached out to patient to [facility] appt. No answer           Medication:  LEVETIRACETAM  MG Oral Tablet 24 Hr      Date of last refill: 11/14/2024 (#30/0)  [place]     Last office visit: 09/25/2023  Due back to clinic per last office note:  6-12 months   Date next office visit scheduled:    Future Appointments   Date Time Provider Department Center   12/16/2024 10:40 AM [facility] [address]           Last OV note recommendation:    ASSESSMENT/PLAN:   (G40.909) Seizure disorder (HCC)  (primary encounter diagnosis)  Plan: levETIRAcetam  MG Oral Tablet 24 Hr               Seizure disorder in remission and had no episodes in last 20 years  Patient elected to continue AED. Will reduce dose further to 500 mg- take Keppra  mg at bedtime     Intermittent tingling and numbness unclear etiology  EMG and NCS negative for polyneuropathy or radiculopathy. Mild right median neuropathy noted   May repeat EMG and NCS and other testing  if symptoms persist or worsens        History of spine chondrosarcoma. Follows with Oncology at [facility]      Follow up in 6-12 months or sooner if necessary  See orders and medications filed with this encounter. The patient indicates understanding of these issues and agrees with the plan.           No orders of the defined types were placed in this encounter.        Meds This Visit:  Requested Prescriptions        No prescriptions requested or ordered in this encounter         Imaging & Referrals:  None

## 2024-12-13 RX ORDER — LEVETIRACETAM 750 MG/1
750 TABLET, FILM COATED, EXTENDED RELEASE ORAL DAILY
Qty: 30 TABLET | Refills: 0 | Status: SHIPPED | OUTPATIENT
Start: 2024-12-13

## 2024-12-16 ENCOUNTER — HOSPITAL ENCOUNTER (OUTPATIENT)
Dept: MAMMOGRAPHY | Age: 44
Discharge: HOME | End: 2024-12-16
Attending: OBSTETRICS & GYNECOLOGY
Payer: COMMERCIAL

## 2024-12-16 DIAGNOSIS — Z12.31 ENCOUNTER FOR SCREENING MAMMOGRAM FOR MALIGNANT NEOPLASM OF BREAST: ICD-10-CM

## 2024-12-16 PROCEDURE — 77067 SCR MAMMO BI INCL CAD: CPT | Performed by: OBSTETRICS & GYNECOLOGY

## 2024-12-16 PROCEDURE — 77063 BREAST TOMOSYNTHESIS BI: CPT | Performed by: OBSTETRICS & GYNECOLOGY

## 2024-12-18 ENCOUNTER — OFFICE VISIT (OUTPATIENT)
Dept: NEUROLOGY | Facility: CLINIC | Age: 44
End: 2024-12-18
Payer: COMMERCIAL

## 2024-12-18 VITALS
WEIGHT: 122 LBS | DIASTOLIC BLOOD PRESSURE: 68 MMHG | HEART RATE: 68 BPM | RESPIRATION RATE: 16 BRPM | BODY MASS INDEX: 21 KG/M2 | SYSTOLIC BLOOD PRESSURE: 114 MMHG

## 2024-12-18 DIAGNOSIS — C41.0 CHORDOMA OF CLIVUS (HCC): ICD-10-CM

## 2024-12-18 DIAGNOSIS — G40.909 SEIZURE DISORDER (HCC): Primary | ICD-10-CM

## 2024-12-18 PROCEDURE — 3078F DIAST BP <80 MM HG: CPT | Performed by: OTHER

## 2024-12-18 PROCEDURE — 3074F SYST BP LT 130 MM HG: CPT | Performed by: OTHER

## 2024-12-18 PROCEDURE — 99214 OFFICE O/P EST MOD 30 MIN: CPT | Performed by: OTHER

## 2024-12-18 RX ORDER — LEVETIRACETAM 750 MG/1
750 TABLET, FILM COATED, EXTENDED RELEASE ORAL DAILY
Qty: 90 TABLET | Refills: 3 | Status: SHIPPED | OUTPATIENT
Start: 2024-12-18

## 2024-12-18 NOTE — PATIENT INSTRUCTIONS
Refill policies:    Allow 2-3 business days for refills; controlled substances may take longer.  Contact your pharmacy at least 5 days prior to running out of medication and have them send an electronic request or submit request through the “request refill” option in your [employer] account.  Refills are not addressed on weekends; covering physicians do not authorize routine medications on weekends.  No narcotics or controlled substances are refilled after noon on Fridays or by on call physicians.  By law, narcotics must be electronically prescribed.  A 30 day supply with no refills is the maximum allowed.  If your prescription is due for a refill, you may be due for a follow up appointment.  To best provide you care, patients receiving routine medications need to be seen at least once a year.  Patients receiving narcotic/controlled substance medications need to be seen at least once every 3 months.  In the event that your preferred pharmacy does not have the requested medication in stock (e.g. Backordered), it is your responsibility to find another pharmacy that has the requested medication available.  We will gladly send a new prescription to that pharmacy at your request.    Scheduling Tests:    If your physician has ordered radiology tests such as MRI or CT scans, please contact Central Scheduling at [phone] right away to schedule the test.  Once scheduled, the [facility] Centralized Referral Team will work with your insurance carrier to obtain pre-certification or prior authorization.  Depending on your insurance carrier, approval may take 3-10 days.  It is highly recommended patients assure they have received an authorization before having a test performed.  If test is done without insurance authorization, patient may be responsible for the entire amount billed.      Precertification and Prior Authorizations:  If your physician has recommended that you have a procedure or additional testing performed the [facility]  Centralized Referral Team will contact your insurance carrier to obtain pre-certification or prior authorization.    You are strongly encouraged to contact your insurance carrier to verify that your procedure/test has been approved and is a COVERED benefit.  Although the [facility] Centralized Referral Team does its due diligence, the insurance carrier gives the disclaimer that \"Although the procedure is authorized, this does not guarantee payment.\"    Ultimately the patient is responsible for payment.   Thank you for your understanding in this matter.  Paperwork Completion:  If you require FMLA or disability paperwork for your recovery, please make sure to either drop it off or have it faxed to our office at [phone]. Be sure the form has your name and date of birth on it.  The form will be faxed to our Forms Department and they will complete it for you.  There is a 25$ fee for all forms that need to be filled out.  Please be aware there is a 10-14 day turnaround time.  You will need to sign a release of information (DEX) form if your paperwork does not come with one.  You may call the Forms Department with any questions at [phone].  Their fax number is [phone].

## 2024-12-18 NOTE — PROGRESS NOTES
HPI:    Patient ID: Janee Alonzo is a 44 year old female.        Patient is a 44 year old pleasant female who presented for follow up for seizure disorder.  Last seen in Oct 2023, at that time we reduce Keppra ER to 500 mg PM seizure well controlled and almost in remission. After the office visit had an episode of syncope and dose was increased back to 750 mg ER for possible seizure. She is doing fine and no further episodes. She thinks it was stress related, has a new L3 enhancing lesion suspicious chondrosarcoma.   Follows with spine neurosurgery [facility]. Case was discussed and observation recommended.  She further report recent MRI brain shows stable unchanged right clivus and right tentorium lesions ( sees Dr José Oncologist)          Oncological history:  She has history of clivus chordoma s/p resection and radiotherapy in  diagnosed in [place] complicated by right partial III nerve palsy and seizure disorder. She also has history of thoracic chondrosarcoma s/p resection at [facility] and Lumbar chondrosarcoma s/p resection in  by Dr Abrams.  She had MRI brain in 2019 which showed no significant interval changes per patient. She is following with Dr Abrams and plan is to do gammaknife radiotherapy    HISTORY:  Past Medical History:    Acute pancreatitis (HCC)    Anxiety    Back problem    spinal tumors    Brain tumor -  s/p removal    Cyclical vomiting    Fatty liver    ESTRELLA (nonalcoholic steatohepatitis)    Osteopenia due to depakote    Pancreatic pseudocyst (HCC)    Decreased size     Sarcoma (HCC)    Seizure disorder (HCC)    last seizure was  after brain surgery      Past Surgical History:   Procedure Laterality Date          Needle biopsy liver  3/26/18= Stage 1 ESTRELLA    Other surgical history  x's in  & x's 1     Brain tumor removal     Other surgical history      spine tumor removal    Other surgical history      eye surgery    Other  surgical history  4/24/2015    PARTIAL LUMBAR 2, LUMBAR 3 LAMINECTOMY, PARTIAL LUMBAR 5, PARTIAL SACRAL 1 LAMINECTOMY , RESECTION OF INTRADURAL TUMOR       Family History   Problem Relation Age of Onset    Cancer Mother 60        Cervical CA    Heart Disorder Mother     Heart Disorder Father       Social History     Socioeconomic History    Marital status:     Number of children: 1   Tobacco Use    Smoking status: Former     Current packs/day: 0.10     Types: Cigarettes    Smokeless tobacco: Never    Tobacco comments:     quit 7/2016   Vaping Use    Vaping status: Never Used   Substance and Sexual Activity    Alcohol use: Not Currently    Drug use: No    Sexual activity: Yes     Partners: Male     Birth control/protection: I.U.D.   Other Topics Concern    Caffeine Concern Yes     Comment: coffee one cup and tea occ     Exercise Yes     Comment: daily            Review of Systems   Constitutional: Negative.    HENT: Negative.     Eyes: Negative.    Respiratory: Negative.     Cardiovascular: Negative.    Gastrointestinal: Negative.    Musculoskeletal: Negative.    Skin: Negative.    Neurological:  Positive for seizures. Negative for dizziness, weakness, numbness and headaches.   Hematological: Negative.    Psychiatric/Behavioral: Negative.     All other systems reviewed and are negative.           Current Outpatient Medications   Medication Sig Dispense Refill    LEVETIRACETAM  MG Oral Tablet 24 Hr TAKE ONE TABLET BY MOUTH ONCE DAILY 30 tablet 0    Calcium Citrate 150 MG Oral Cap Orally      Levonorgestrel (MIRENA, 52 MG,) 20 MCG/DAY Intrauterine IUD Mirena      vitamin E 400 UNITS Oral Cap Take 1 capsule (400 Units total) by mouth daily. 30 capsule 3    Calcium Carbonate-Vit D-Min (CALCIUM 1200 OR) Take 1 tablet by mouth daily.      Multiple Vitamins-Minerals (MULTIVITAMIN OR) Take 1 tablet by mouth daily.      Magnesium 500 MG Oral Tab Take 1 tablet (500 mg total) by mouth daily.      Glucosamine  HCl-Glucosamin SO4 (GLUCOSAMINE COMPLEX OR) Take 1 tablet by mouth daily.       Allergies:  Allergies   Allergen Reactions    Ampicillin UNKNOWN     Had reaction as a child.    Pcn [Penicillins] UNKNOWN     Had reaction as a child.      PHYSICAL EXAM:   Physical Exam  Blood pressure 114/68, pulse 68, resp. rate 16, weight 122 lb (55.3 kg), not currently breastfeeding.  General: A 44 year old female, well developed, well nourished  HEENT: Normocephalic and atraumatic. Normal sclera  Cardiovascular: Normal rate, regular rhythm and normal heart sounds.    Pulmonary/Chest: Effort normal and breath sounds normal.   Abdominal: Soft. Bowel sounds are normal.   Skin: Skin is warm and dry.   Psychiatric:normal mood and affect.   NEUROLOGICAL: This patient is alert and orientated x 3. Speech is fluent with intact comprehension.   Pupils equally round and reactive to light. 3+ brisk bilaterally. EOMs intact. Visual fields are full. Face is symmetrical. Tongue is midline. Uvula and palate elevate symmetrically. Shrug shoulders normally bilaterally. The rest of the cranial nerves are grossly intact.   Sensation to light touch is intact bilaterally.   Motor: Normal tone. No arm or leg weakness noted, strength approximately 5/5 bilaterally.   Finger-to-nose coordination is intact. Gait brisk and steady.              Imaging       MRI brain:  5/25/2024  1. Stable postsurgical changes as above.     2. Unchanged heterogeneous partially enhancing lesion in the right clivus and petroclival synchondrosis without worsening mass effect or edema.     3. Unchanged heterogeneous enhancing mineralized nodule along the posterior and inferiomedial left tentorium without significant mass effect or edema.     4. No new pathologic enhancement.       MRI lumbar spine:  8/26/2024    There are 5 nonrib-bearing lumbar type vertebral bodies with the last well-formed disc space labeled L5-S1.     There is maintenance of the normal lumbar spine lordosis.      Similar postradiation changes with fatty marrow replacement. Progressively increased in size now 1.5 cm enhancing lesion L3 vertebral body. Postsurgical changes L2-3 and L5-S1 laminectomies.     The conus medullaris terminates at the L1-2 level and is normal in signal.     No epidural collections are appreciated.     Postsurgical changes in the posterior paraspinal soft tissues.     There are no significant spondylotic changes of the lumbar spine. No evidence of significant central canal stenosis or foraminal stenosis.     At T12-L1: No disc herniation. No central spinal canal or foraminal stenosis.     At L1-L2: No disc herniation. No central spinal canal or foraminal stenosis.     At L2-L3: Postsurgical changes laminectomy. Bulging disc along with facet arthrosis. No central spinal canal or foraminal stenosis.     At L3-L4: Annular fissure and bulging disc along with facet arthrosis and ligamentum flavum hypertrophy. Mild central spinal canal stenosis. No foraminal stenosis.     At L4-L5: Bulging disc with superimposed central disc protrusion along with facet arthrosis, trace facet joint effusions, and ligamentum flavum hypertrophy. Mild bilateral foraminal stenosis. No central spinal canal stenosis.     At L5-S1: Annular fissure and bulging disc along with facet arthrosis and ligamentum flavum hypertrophy. No central spinal canal or foraminal stenosis.         IMPRESSION:     1. Progressively increased in size now 1.5 cm enhancing lesion L3 vertebral body.          ASSESSMENT/PLAN:   (G40.909) Seizure disorder (HCC)  (primary encounter diagnosis)  Plan: levETIRAcetam  MG Oral Tablet 24 Hr             Seizure disorder well controlled, has an episode of syncope last year ? Suspected seizure  Continue Keppra  mg nightly.    Intermittent tingling and numbness unclear etiology  EMG and NCS negative for polyneuropathy or radiculopathy. Mild right median neuropathy noted   May repeat EMG and NCS and  other testing  if symptoms persist or worsens      New L3 vertebral body lesion. History of spine chondrosarcoma.   Follows with Neurosurgery Oncology at [facility] in 6-12 months       See orders and medications filed with this encounter. The patient indicates understanding of these issues and agrees with the plan.        No orders of the defined types were placed in this encounter.      Meds This Visit:  Requested Prescriptions      No prescriptions requested or ordered in this encounter       Imaging & Referrals:  None       ID#[id number]    Neurologic Problem  The patient's pertinent negatives include no weakness. Pertinent negatives include no dizziness or headaches.   Seizures  Pertinent negatives include no headaches, numbness or weakness.

## 2025-03-05 ENCOUNTER — HOSPITAL ENCOUNTER (OUTPATIENT)
Dept: MAMMOGRAPHY | Facility: HOSPITAL | Age: 45
Discharge: HOME | End: 2025-03-05
Attending: OBSTETRICS & GYNECOLOGY
Payer: COMMERCIAL

## 2025-03-05 DIAGNOSIS — R92.30 DENSE BREASTS: ICD-10-CM

## 2025-03-05 DIAGNOSIS — Z12.39 ENCOUNTER FOR OTHER SCREENING FOR MALIGNANT NEOPLASM OF BREAST: ICD-10-CM

## 2025-03-05 DIAGNOSIS — R92.2 INCONCLUSIVE MAMMOGRAM: ICD-10-CM

## 2025-03-05 PROCEDURE — 76641 ULTRASOUND BREAST COMPLETE: CPT | Performed by: OBSTETRICS & GYNECOLOGY

## 2025-03-12 ENCOUNTER — TELEPHONE (OUTPATIENT)
Dept: FAMILY MEDICINE CLINIC | Facility: CLINIC | Age: 45
End: 2025-03-12

## 2025-03-12 NOTE — TELEPHONE ENCOUNTER
I need to have time to review my schedule, but will make it work. I may not figure that out today, likely tomorrow    Arturo Calderon MD

## 2025-03-12 NOTE — TELEPHONE ENCOUNTER
Patient calling for pre surg appt (nothing received or in folder) she is having surgery at  on 3/26/25 with   Dr. Garzon - nothing avail in timeframe, please advise where patient can be seen

## 2025-03-17 ENCOUNTER — OFFICE VISIT (OUTPATIENT)
Dept: FAMILY MEDICINE CLINIC | Facility: CLINIC | Age: 45
End: 2025-03-17
Payer: COMMERCIAL

## 2025-03-17 VITALS
BODY MASS INDEX: 21.17 KG/M2 | OXYGEN SATURATION: 98 % | RESPIRATION RATE: 16 BRPM | HEART RATE: 66 BPM | HEIGHT: 64 IN | SYSTOLIC BLOOD PRESSURE: 112 MMHG | WEIGHT: 124 LBS | DIASTOLIC BLOOD PRESSURE: 62 MMHG

## 2025-03-17 DIAGNOSIS — M89.9 LESION OF BONE OF LUMBOSACRAL SPINE: ICD-10-CM

## 2025-03-17 DIAGNOSIS — Z01.818 PREOP EXAMINATION: Primary | ICD-10-CM

## 2025-03-17 PROCEDURE — 3074F SYST BP LT 130 MM HG: CPT | Performed by: FAMILY MEDICINE

## 2025-03-17 PROCEDURE — 3078F DIAST BP <80 MM HG: CPT | Performed by: FAMILY MEDICINE

## 2025-03-17 PROCEDURE — 99213 OFFICE O/P EST LOW 20 MIN: CPT | Performed by: FAMILY MEDICINE

## 2025-03-17 PROCEDURE — 3008F BODY MASS INDEX DOCD: CPT | Performed by: FAMILY MEDICINE

## 2025-03-17 NOTE — PROGRESS NOTES
Janee Alonzo is a 44 year old female who presents for a pre-operative physical exam.   Janee Alonzo is scheduled for a radioablation and biopsy procedure at [facility] on 3/26/25 performed by Dr Garzon. Indication: enhancing lesion of L3, hx of chondrosarcoma    HPI related to surgery:   Janee has history of chondrosarcoma and enhancing lesion and increasing and plan to biopsy and ablate area. Noted in , and was small,  noted larger and now plan made to proceed due mentioned concerns.    She  has had previous anesthesia:  Yes.  Previous complications:  No    Social History     Socioeconomic History    Marital status:     Number of children: 1   Tobacco Use    Smoking status: Former     Current packs/day: 0.10     Types: Cigarettes    Smokeless tobacco: Never    Tobacco comments:     quit 2016   Vaping Use    Vaping status: Never Used   Substance and Sexual Activity    Alcohol use: Not Currently    Drug use: No    Sexual activity: Yes     Partners: Male     Birth control/protection: I.U.D.   Other Topics Concern    Caffeine Concern Yes     Comment: coffee one cup and tea occ     Exercise Yes     Comment: daily      Past Medical History:    Acute pancreatitis (HCC)    Anxiety    Back problem    spinal tumors    Brain tumor -  s/p removal    Cyclical vomiting    Fatty liver    ESTRELLA (nonalcoholic steatohepatitis)    Osteopenia due to depakote    Pancreatic pseudocyst (HCC)    Decreased size     Sarcoma (HCC)    Seizure disorder (HCC)    last seizure was  after brain surgery     Past Surgical History:   Procedure Laterality Date          Needle biopsy liver  3/26/18= Stage 1 ESTRELLA    Other surgical history  x's in  & x's 1     Brain tumor removal     Other surgical history      spine tumor removal    Other surgical history      eye surgery    Other surgical history  2015    PARTIAL LUMBAR 2, LUMBAR 3 LAMINECTOMY, PARTIAL LUMBAR 5,  PARTIAL SACRAL 1 LAMINECTOMY , RESECTION OF INTRADURAL TUMOR      Allergies: Allergies[1]  Current Outpatient Medications   Medication Sig Dispense Refill    levETIRAcetam  MG Oral Tablet 24 Hr Take 1 tablet (750 mg total) by mouth daily. 90 tablet 3    Calcium Citrate 150 MG Oral Cap Orally      Levonorgestrel (MIRENA, 52 MG,) 20 MCG/DAY Intrauterine IUD Mirena      vitamin E 400 UNITS Oral Cap Take 1 capsule (400 Units total) by mouth daily. 30 capsule 3    Calcium Carbonate-Vit D-Min (CALCIUM 1200 OR) Take 1 tablet by mouth daily.      Multiple Vitamins-Minerals (MULTIVITAMIN OR) Take 1 tablet by mouth daily.      Magnesium 500 MG Oral Tab Take 1 tablet (500 mg total) by mouth daily.      Glucosamine HCl-Glucosamin SO4 (GLUCOSAMINE COMPLEX OR) Take 1 tablet by mouth daily.          REVIEW OF SYSTEMS:   Negative complete ROS    PHYSICAL EXAM:   /62   Pulse 66   Resp 16   Ht 5' 4\" (1.626 m)   Wt 124 lb (56.2 kg)   SpO2 98%   BMI 21.28 kg/m²    ENT: PERRLA, EOMI, TM clear  CV: RRR, s1 and s2 present, no murmurs clicks or rubs  Resp: clear to auscultation bilaterally  Abd: BS+, no organomegaly or palpable abnormality  Ext:No edema, distal pulses intact upper and lower bilaterally  Skin:no rashes or lesions      LABORATORY DATA:   Reviewed labs, EKG and are normal/stable    ASSESSMENT AND PLAN:   Janee Alonzo has no significant history of cardiac or pulmonary conditions.   She is a good surgical candidate. This consult was sent back the referring physician, Dr. Garzon.    Assessment:  Encounter Diagnoses   Name Primary?    Preop examination Yes    Lesion of bone of lumbosacral spine          Plan   No orders of the defined types were placed in this encounter.        Arturo Calderon MD          [1]   Allergies  Allergen Reactions    Ampicillin UNKNOWN     Had reaction as a child.    Pcn [Penicillins] UNKNOWN     Had reaction as a child.

## (undated) DIAGNOSIS — G40.909 SEIZURE DISORDER (HCC): ICD-10-CM

## (undated) NOTE — MR AVS SNAPSHOT
[address], [address] [place] [address] [phone]               Thank you for choosing us for your health care visit with Pilar Tucker MD.  We are glad to serve you and happy to provide you with th authorization numbers or be assured that none are required. You can then schedule your appointment. Failure to obtain required authorization numbers can create reimbursement difficulties for you. Functional Status questions complete?:      Assoc Dx:   Freddy ? The name of the person picking up your prescription must be documented in your chart.   Scheduling Tests    If your physician has ordered radiology tests such as MRI or CT scans, do not schedule the test until this office has notified you that the test ha Commonly known as:  cyclobenzaprine           GLUCOSAMINE COMPLEX OR   Take 1 tablet by mouth daily. LevETIRAcetam  MG Tb24   TAKE 1 TABLET BY MOUTH NIGHTLY. Magnesium 500 MG Tabs   Take 1 tablet by mouth daily.            MULTIVIT

## (undated) NOTE — MR AVS SNAPSHOT
[address] [phone]               Thank you for choosing us for your health care visit with Larisa Hart MD.  We are glad to serve you and happy to provide you with this authorize routine medications on weekends. ? No narcotics or controlled substances are refilled after noon on Fridays or by on call physicians. ? By law, narcotics cannot be faxed or phoned into your pharmacy.  The prescription must be signed by the provi diligence, the insurance carrier gives the disclaimer that \"Although the procedure is authorized, this does not guarantee payment. \"    Ultimately the patient is responsible for payment. Thank you for your understanding in the matter.            [place]

## (undated) NOTE — MR AVS SNAPSHOT
[address], [address] [place], [place] [id number] [phone]               Thank you for choosing us for your health care visit with SAMMIE Matt.   We are glad to serve you and happy to provide you with [facility] discharge instructions in [employer] by going to Visits < Admission Summaries. If you've been to the Emergency Department or your doctor's office, you can view your past visit information in [employer] by going to Visits < Visit Summaries. [employer] questions?

## (undated) NOTE — MR AVS SNAPSHOT
[facility] [address]  [address] [facility] [phone]               Thank you for choosing us for your health care visit with Alex Beaulieu MD.  We are glad to serve you and happy to provide you with this  protocol for controlled substances:  Written prescriptions      ? EFFECTIVE April 1, 2017 PATIENTS MUST  THEIR OWN NARCOTIC PRESCRIPTIONS. ? Written prescriptions must be picked up in office. ?  Please allow the office 2-3 business days to 124/80 mmHg 84 136 lb            Current Medications          This list is accurate as of: 6/12/17 10:33 AM.  Always use your most recent med list.                CALCIUM 1200 OR   Take 1 tablet by mouth daily.            Cyclobenzaprine HCl 10 MG Tabs   T